# Patient Record
Sex: FEMALE
[De-identification: names, ages, dates, MRNs, and addresses within clinical notes are randomized per-mention and may not be internally consistent; named-entity substitution may affect disease eponyms.]

---

## 2020-11-16 ENCOUNTER — APPOINTMENT (OUTPATIENT)
Dept: VASCULAR SURGERY | Facility: CLINIC | Age: 80
End: 2020-11-16
Payer: MEDICARE

## 2020-11-16 VITALS — HEART RATE: 67 BPM | DIASTOLIC BLOOD PRESSURE: 61 MMHG | SYSTOLIC BLOOD PRESSURE: 120 MMHG

## 2020-11-16 VITALS — TEMPERATURE: 97.8 F

## 2020-11-16 DIAGNOSIS — I83.90 ASYMPTOMATIC VARICOSE VEINS OF UNSPECIFIED LOWER EXTREMITY: ICD-10-CM

## 2020-11-16 DIAGNOSIS — I25.2 OLD MYOCARDIAL INFARCTION: ICD-10-CM

## 2020-11-16 DIAGNOSIS — Z86.39 PERSONAL HISTORY OF OTHER ENDOCRINE, NUTRITIONAL AND METABOLIC DISEASE: ICD-10-CM

## 2020-11-16 DIAGNOSIS — Z86.73 PERSONAL HISTORY OF TRANSIENT ISCHEMIC ATTACK (TIA), AND CEREBRAL INFARCTION W/OUT RESIDUAL DEFICITS: ICD-10-CM

## 2020-11-16 DIAGNOSIS — I10 ESSENTIAL (PRIMARY) HYPERTENSION: ICD-10-CM

## 2020-11-16 DIAGNOSIS — Z87.448 PERSONAL HISTORY OF OTHER DISEASES OF URINARY SYSTEM: ICD-10-CM

## 2020-11-16 PROCEDURE — 93971 EXTREMITY STUDY: CPT

## 2020-11-16 PROCEDURE — 93880 EXTRACRANIAL BILAT STUDY: CPT

## 2020-11-16 PROCEDURE — 99024 POSTOP FOLLOW-UP VISIT: CPT

## 2020-11-16 RX ORDER — ALLOPURINOL 200 MG/1
TABLET ORAL
Refills: 0 | Status: ACTIVE | COMMUNITY

## 2020-11-16 RX ORDER — CARVEDILOL 3.12 MG/1
TABLET, FILM COATED ORAL
Refills: 0 | Status: ACTIVE | COMMUNITY

## 2020-11-16 RX ORDER — SITAGLIPTIN 100 MG/1
TABLET, FILM COATED ORAL
Refills: 0 | Status: ACTIVE | COMMUNITY

## 2020-11-16 RX ORDER — MEMANTINE HYDROCHLORIDE 10 MG/1
10 TABLET, FILM COATED ORAL
Refills: 0 | Status: ACTIVE | COMMUNITY

## 2020-11-16 RX ORDER — ATORVASTATIN CALCIUM 80 MG/1
TABLET, FILM COATED ORAL
Refills: 0 | Status: ACTIVE | COMMUNITY

## 2020-11-16 RX ORDER — LEVOTHYROXINE SODIUM 137 UG/1
TABLET ORAL
Refills: 0 | Status: ACTIVE | COMMUNITY

## 2020-11-16 RX ORDER — FENOFIBRATE 120 MG/1
TABLET ORAL
Refills: 0 | Status: ACTIVE | COMMUNITY

## 2020-11-16 RX ORDER — OMEPRAZOLE 20 MG/1
TABLET, DELAYED RELEASE ORAL
Refills: 0 | Status: ACTIVE | COMMUNITY

## 2020-11-16 RX ORDER — OXYBUTYNIN CHLORIDE 5 MG/1
5 TABLET ORAL
Refills: 0 | Status: ACTIVE | COMMUNITY

## 2020-11-16 RX ORDER — PIOGLITAZONE HYDROCHLORIDE 15 MG/1
15 TABLET ORAL
Refills: 0 | Status: ACTIVE | COMMUNITY

## 2020-11-16 RX ORDER — CLOPIDOGREL 75 MG/1
75 TABLET, FILM COATED ORAL
Refills: 0 | Status: ACTIVE | COMMUNITY

## 2020-11-16 RX ORDER — MONTELUKAST 10 MG/1
10 TABLET, FILM COATED ORAL
Refills: 0 | Status: ACTIVE | COMMUNITY

## 2020-11-16 RX ORDER — AMLODIPINE BESYLATE 5 MG/1
TABLET ORAL
Refills: 0 | Status: ACTIVE | COMMUNITY

## 2020-11-16 RX ORDER — FUROSEMIDE 20 MG/1
20 TABLET ORAL
Refills: 0 | Status: ACTIVE | COMMUNITY

## 2020-11-16 NOTE — HISTORY OF PRESENT ILLNESS
[FreeTextEntry1] : 79 yo female with a history of htn, dm, hypothyroidism, cad s/p mi, cva, ckd with worsening renal function presents for evaluation.  pt is right hand dominant without any history of ppm or surgery of left upper extremity.  \par pt reports history of cva went to [facility] in april and may reports left sided facial drop that had then resolved.

## 2020-11-16 NOTE — PHYSICAL EXAM
[2+] : left 2+ [No Rash or Lesion] : No rash or lesion [Alert] : alert [Calm] : calm [JVD] : no jugular venous distention  [Normal Breath Sounds] : Normal breath sounds [Normal Heart Sounds] : normal heart sounds [Ankle Swelling (On Exam)] : not present [Abdomen Masses] : No abdominal masses [Skin Ulcer] : no ulcer [de-identified] : appears well [de-identified] : no facial asymmetry

## 2020-11-16 NOTE — ASSESSMENT
[FreeTextEntry1] : 81 yo female with a history of htn, dm, hypothyroidism, cad s/p mi, cva, ckd with worsening renal function presents for evaluation\par \par vein mapping shows adequate left upper extremity cephalic vein at the wrist measuring 0.22-0.25 cm \par will plan for left upper extremity radial cephalic avf\par \par carotid duplex shows 16-49% stenosis b/l ica \par \par pt advised to start left upper extremity precautions no iv, no venipunctures and no bp cuff left upper extremity

## 2020-12-01 ENCOUNTER — OUTPATIENT (OUTPATIENT)
Dept: OUTPATIENT SERVICES | Facility: HOSPITAL | Age: 80
LOS: 1 days | End: 2020-12-01

## 2020-12-01 ENCOUNTER — APPOINTMENT (OUTPATIENT)
Dept: DISASTER EMERGENCY | Facility: CLINIC | Age: 80
End: 2020-12-01

## 2020-12-01 VITALS
HEIGHT: 61 IN | DIASTOLIC BLOOD PRESSURE: 64 MMHG | RESPIRATION RATE: 18 BRPM | SYSTOLIC BLOOD PRESSURE: 110 MMHG | WEIGHT: 169.09 LBS | OXYGEN SATURATION: 98 % | TEMPERATURE: 98 F | HEART RATE: 71 BPM

## 2020-12-01 DIAGNOSIS — N18.9 CHRONIC KIDNEY DISEASE, UNSPECIFIED: ICD-10-CM

## 2020-12-01 DIAGNOSIS — Z98.89 OTHER SPECIFIED POSTPROCEDURAL STATES: Chronic | ICD-10-CM

## 2020-12-01 DIAGNOSIS — M10.9 GOUT, UNSPECIFIED: ICD-10-CM

## 2020-12-01 DIAGNOSIS — Z98.51 TUBAL LIGATION STATUS: Chronic | ICD-10-CM

## 2020-12-01 DIAGNOSIS — E78.5 HYPERLIPIDEMIA, UNSPECIFIED: ICD-10-CM

## 2020-12-01 DIAGNOSIS — E03.9 HYPOTHYROIDISM, UNSPECIFIED: ICD-10-CM

## 2020-12-01 DIAGNOSIS — J30.2 OTHER SEASONAL ALLERGIC RHINITIS: ICD-10-CM

## 2020-12-01 DIAGNOSIS — I10 ESSENTIAL (PRIMARY) HYPERTENSION: ICD-10-CM

## 2020-12-01 DIAGNOSIS — Z01.818 ENCOUNTER FOR OTHER PREPROCEDURAL EXAMINATION: ICD-10-CM

## 2020-12-01 DIAGNOSIS — E11.9 TYPE 2 DIABETES MELLITUS WITHOUT COMPLICATIONS: ICD-10-CM

## 2020-12-01 RX ORDER — SODIUM CHLORIDE 9 MG/ML
3 INJECTION INTRAMUSCULAR; INTRAVENOUS; SUBCUTANEOUS EVERY 8 HOURS
Refills: 0 | Status: DISCONTINUED | OUTPATIENT
Start: 2020-12-03 | End: 2020-12-10

## 2020-12-01 NOTE — H&P PST ADULT - RS GEN PE MLT RESP DETAILS PC
airway patent/breath sounds equal/good air movement/no rhonchi/clear to auscultation bilaterally/respirations non-labored/no subcutaneous emphysema/no chest wall tenderness/normal/no wheezes/no intercostal retractions/no rales

## 2020-12-01 NOTE — H&P PST ADULT - HEART RATE (BEATS/MIN)
Problem: Communication  Goal: The ability to communicate needs accurately and effectively will improve    Intervention: Educate patient and significant other/support system about the plan of care, procedures, treatments, medications and allow for questions  Plan of care discussed with pt.      Problem: Venous Thromboembolism (VTW)/Deep Vein Thrombosis (DVT) Prevention:  Goal: Patient will participate in Venous Thrombosis (VTE)/Deep Vein Thrombosis (DVT)Prevention Measures    Intervention: Ensure patient wears graduated elastic stockings (KEITH hose) and/or SCDs, if ordered, when in bed or chair (Remove at least once per shift for skin check)  Pt wearing SCD's         71

## 2020-12-01 NOTE — H&P PST ADULT - GASTROINTESTINAL DETAILS
no masses palpable/bowel sounds normal/no bruit/nontender/no distention/normal/no rebound tenderness/soft

## 2020-12-01 NOTE — H&P PST ADULT - ACTIVITY
[facility] BE[facility]AVIOR[facility]  [facility]  [address]  [place] [address]      Juan Carlos Beckwith :1985 MRN:[id number]    2020 Time Session Began: 1100  Time Session Ended: 1200    Due to COVID-19 precautions, this visit was performed via live interactive two-way video with patient's verbal consent.   Clinician Location:Home.  Patient Location: Home.  Verified patient identity:  [x] Yes    Session Type:60 Minute Therapy (15376)    Others Present: no    Intervention: Behavioral, Cognitive, ACT, BSP, Interpersonal    Suicide/Homicide/Violence Ideation: No    If Yes, explain:     Current Outpatient Medications   Medication Sig   • cloNIDine (CATAPRES-TTS 2) 0.2 MG/24HR Place 1 patch onto the skin 1 day a week. Box includes patch and non-medicated adhesive cover. Apply adhesive cover if patch begins to lift.   • clonazePAM (KLONOPIN) 1 MG tablet Take 0.5-1 tablets by mouth 2 times daily as needed (severe anxiety, panic).   • zolpidem (AMBIEN) 5 MG tablet Take 1 tablet by mouth nightly as needed for Sleep.   • cloNIDine (CATAPRES) 0.1 MG tablet Take 2 tablets by mouth at bedtime AND 1 tablet daily. May also take 1 tablet 3 times daily as needed (anxiety, anger, PTSD).   • ibuprofen (MOTRIN) 600 MG tablet Take 600 mg by mouth every 6 hours.     No current facility-administered medications for this visit.        Change in Medication(s) Reported: No  If Yes, explain:     Patient/Family Education Provided: Yes  Patient/Family Displays Understanding: Yes    If No, explain:     Chief complaint in patient's own words: \"I am working on assertiveness and starting to live a meaningful life again\"    Progress Note containing chief complaint and symptoms/problems related to the complaint:    (Data/Action/Response/Plan)    D:   Client was agreeable to telehealth appointment.  Client reported some frustrations with starting new employment and the required online training that is not  being operated efficiently.  Client reported increased use acceptance and non judgemental list to help navigate this recent stressor.  Client reported intent to move in with his brother in the near term.     A: Writer provided support and empathy.  Explored client's emotional reactions and adjustment to starting new employment.  Discussed client's and of his prior employment and reviewed the process of starting a new job and terminating with that job on his mental health.  Discussed how his prior employment helped him build self trust in his ability to communicate with others and provided opportunities to increase use of acceptance.  Ended session answering client's question regarding setting goals.  Client wondered about the strategy of setting very high goals to help motivate action even if they are not achieved verses setting more moderate and realistic goals.  Encouraged client to set more realistic goals given client's past tendency for depressed moods.     Treatment plan- Address trauma with Adaptive disclosure, relaxation training, and mindfulness skills to practice diaphragmatic breathing, journal/write about previous losses/moral injuries, develop emotional tolerance (guilt, shame, fear, sadness), and increasing social activities.      Goal #3: Discharge Planning How will you know that you don't need to come to therapy anymore?    Slight improvement, Retain goal-Ability to socialize (go to sporting events) without reliance on alcohol or drugs to mitigate hypervigilance. Updated 6/3/20- improved ability to use less alcohol/drugs when socializing with trusted friends/family  Modify goal: Advancing in employment  Significant improvement: Improving ability to communicate assertively with others:Updated 6/3/20 improved when rehearsed ahead of time; still difficulty reacting in the moment  Improved and retain goal: Quitting smoking cigarettes:Updated 6/3/20 aware that smoking increases more with stress, decrease  in patch usage and cigarette intake  New goal: Reduced intensity of emotions based on emotional processing work: current level rating: work 6.5, home 5.5, family 6.        3/20/19 Previous Assessment Measures:   MARIIA-7 (Anxiety): 20  PHQ-9 (Depression): 18  PCL-5 (PTSD): 47  MIES (Moral Injury Event Scale): 38  Q-LES-Q-SF (Quality of Life Event and Satisfaction): 37%     7/30/19  PHQ score: 13  MARIIA score: 16  PCL-5 score: 41     2/26/20  PHQ score:  17  MARIIA score:  14  PCL-5 score: 37     Attending weekly: Mindfulness, emotional intelligence, anger management, communication, and  identity groups to help build education and develop skills for trauma processing.     R: The client was engaged throughout the session.  CT’s mood was calm, affect constricted. Ct stated understanding of material discussed.  Client stated intent to set more moderate goals given the discussed a period client was very reflective on his past occupational experience.  Client stated he may have to work a more late night schedule at his new employment but will contact his employment to see if they can modify his shift due to past sleep problems.    P:  Continue act and [place] focus to approved acceptance and process past losses and traumatic events that make it difficult for client to experience calmness, elvira, happiness.    Need for Community Resources Assessed: Yes    Resources Needed: Yes    If Yes, what resources:  support group    Diagnosis: PTSD (post-traumatic stress disorder)  (primary encounter diagnosis)    Treatment Plan: Unchanged, last updated 6/3/20.  Client unable to sign due to corona virus.  Client provided verbal agreement.  Client will sign when in person appointments resume.    Discharge Plan: N/A    Next Appointment: 1 week      Braulio David PSYD   tolerates stair climbing without any exertional symptoms ; uses walker or cane for ambulation due to poor balance

## 2020-12-01 NOTE — H&P PST ADULT - NEGATIVE ALLERGY TYPES
no reactions to insect bites/no reactions to food/no reactions to animals/no indoor environmental allergies/no reactions to medicines

## 2020-12-01 NOTE — H&P PST ADULT - NSICDXPASTMEDICALHX_GEN_ALL_CORE_FT
PAST MEDICAL HISTORY:  Chronic kidney disease, unspecified stage     Dyspepsia     Essential hypertension     Former smoker     Hypertension     Type 2 diabetes mellitus with stage 4 chronic kidney disease, unspecified long term insulin use status

## 2020-12-01 NOTE — H&P PST ADULT - HISTORY OF PRESENT ILLNESS
80 yr old  female with PMH of Diabetes, HTN, HLD, gout, CVA with right hemiplegia, seasonal allergies, Hypothyroidism, mild memory loss, GERD, stress incontinence presents with c/o decreased renal function due to CKD. Last EGFR 12, creatinine 3.34. Pt for left upper extremity radiocephalic  arteriovenous fistula creation on 12/03/2020.

## 2020-12-01 NOTE — H&P PST ADULT - NSICDXFAMILYHX_GEN_ALL_CORE_FT
FAMILY HISTORY:  Family history of heart attack, sister  Family history of heart disease, sister

## 2020-12-01 NOTE — H&P PST ADULT - NS PRO REFERRAL CMGT
Receiving homecare prior to admission/[facility] [phone] Home attendant services from [facility] ( 4 hours a day on weekdays and 5 hours on weekends) [phone]/Receiving homecare prior to admission

## 2020-12-01 NOTE — H&P PST ADULT - ASSESSMENT
80 yr old  female with PMH of Diabetes, HTN, HLD, gout, CVA with right hemiplegia, seasonal allergies, Hypothyroidism, mild memory loss, GERD, stress incontinence presents with chronic kidney disease. Last EGFR 12, creatinine 3.34. Pt is scheduled for left upper extremity radiocephalic  arteriovenous fistula creation on 12/03/2020.

## 2020-12-01 NOTE — H&P PST ADULT - DOES PATIENT HAVE ADVANCE DIRECTIVE
Pt's daughter Pamela Bishop will make decisions in case of emergency/No No/Pt's daughter Pamela Bishop [phone] will make decisions in case of emergency

## 2020-12-01 NOTE — H&P PST ADULT - NSICDXPROBLEM_GEN_ALL_CORE_FT
PROBLEM DIAGNOSES  Problem: Seasonal allergies  Assessment and Plan: Instructed to continue medication as needed and to follow-up with PCP for allergy management.     Problem: Hypothyroidism  Assessment and Plan: Instructed to continue Levothyroxine and take with sips of water on day of surgery. Follow-up with provider for management.       Problem: DM (diabetes mellitus)  Assessment and Plan: Perioperative glucose monitoring and coverage as needed. Follow-up with PCP for diabetic management     Problem: Gout  Assessment and Plan: Instructed to continue Allopurinol and to follow-up with provider for management.     Problem: HLD (hyperlipidemia)  Assessment and Plan: Instructed pt to continue Atorvastatin and to follow-up with PCP for lipid management     Problem: HTN (hypertension)  Assessment and Plan: Instructed to continue carvedilol and take with sips of water on day of surgery.  Cleared by PCP. Follow-up with PCP for management.     Problem: Chronic kidney disease (CKD)  Assessment and Plan: Left upper extremity radiocephalic arteriovenous fistula creationon 12/03/2020. Preoperative instructions discussed with pt via Hong Konger speaking daughter and given to pt. Instructed pt and pt's daughter that she will need someone to escort her home after surgery, that no one will be allowed to come to hospital with her, to notify security when she arrives in the arrives in the lobby of the hospital that she is here for surgery, not to eat or drink anything after midnight the night before the surgery, to avoid NSAIDS such as Ibuprofen, motrin, aleve, advil, naproxen before surgery, to take Tylenol if needed for pain, to report if she has been exposed to any one with any contagious diseases including Covid-19 or if she is exhibiting any symptoms of COVID-19. Instructed about use of Chlorhexidine 4% soap before surgery. Verbalized understanding of instructions given.        PROBLEM DIAGNOSES  Problem: At risk for venous thromboembolism (VTE)  Assessment and Plan: VTE score risk 5. Perioperative VTE prophylaxis     Problem: CVA (cerebrovascular accident)  Assessment and Plan: with right hemiplegia. Safety measures perioperatively     Problem: Seasonal allergies  Assessment and Plan: Instructed to continue medication as needed and to follow-up with PCP for allergy management.     Problem: Hypothyroidism  Assessment and Plan: Instructed to continue Levothyroxine and take with sips of water on day of surgery. Follow-up with provider for management.       Problem: DM (diabetes mellitus)  Assessment and Plan: Perioperative glucose monitoring and coverage as needed. Follow-up with PCP for diabetic management     Problem: Gout  Assessment and Plan: Instructed to continue Allopurinol and to follow-up with provider for management.     Problem: HLD (hyperlipidemia)  Assessment and Plan: Instructed pt to continue Atorvastatin and to follow-up with PCP for lipid management     Problem: HTN (hypertension)  Assessment and Plan: Instructed to continue carvedilol and take with sips of water on day of surgery.  Cleared by PCP. Follow-up with PCP for management.     Problem: Chronic kidney disease (CKD)  Assessment and Plan: Left upper extremity radiocephalic arteriovenous fistula creationon 12/03/2020. Preoperative instructions discussed with pt via Angolan speaking daughter and given to pt. Instructed pt and pt's daughter that she will need someone to escort her home after surgery, that no one will be allowed to come to hospital with her, to notify security when she arrives in the arrives in the lobby of the hospital that she is here for surgery, not to eat or drink anything after midnight the night before the surgery, to avoid NSAIDS such as Ibuprofen, motrin, aleve, advil, naproxen before surgery, to take Tylenol if needed for pain, to report if she has been exposed to any one with any contagious diseases including Covid-19 or if she is exhibiting any symptoms of COVID-19. Instructed about use of Chlorhexidine 4% soap before surgery. Verbalized understanding of instructions given.

## 2020-12-01 NOTE — H&P PST ADULT - NEGATIVE GASTROINTESTINAL SYMPTOMS
no change in bowel habits/no flatulence/no abdominal pain/no vomiting/no nausea/no diarrhea/no melena/no constipation

## 2020-12-02 ENCOUNTER — TRANSCRIPTION ENCOUNTER (OUTPATIENT)
Age: 80
End: 2020-12-02

## 2020-12-02 DIAGNOSIS — I63.9 CEREBRAL INFARCTION, UNSPECIFIED: ICD-10-CM

## 2020-12-02 DIAGNOSIS — Z91.89 OTHER SPECIFIED PERSONAL RISK FACTORS, NOT ELSEWHERE CLASSIFIED: ICD-10-CM

## 2020-12-02 LAB — SARS-COV-2 N GENE NPH QL NAA+PROBE: NOT DETECTED

## 2020-12-03 ENCOUNTER — APPOINTMENT (OUTPATIENT)
Dept: VASCULAR SURGERY | Facility: HOSPITAL | Age: 80
End: 2020-12-03
Payer: MEDICARE

## 2020-12-03 ENCOUNTER — OUTPATIENT (OUTPATIENT)
Dept: OUTPATIENT SERVICES | Facility: HOSPITAL | Age: 80
LOS: 1 days | End: 2020-12-03
Payer: COMMERCIAL

## 2020-12-03 VITALS
DIASTOLIC BLOOD PRESSURE: 54 MMHG | OXYGEN SATURATION: 97 % | SYSTOLIC BLOOD PRESSURE: 135 MMHG | HEART RATE: 72 BPM | RESPIRATION RATE: 16 BRPM | WEIGHT: 169.09 LBS | HEIGHT: 61 IN | TEMPERATURE: 98 F

## 2020-12-03 VITALS
RESPIRATION RATE: 16 BRPM | DIASTOLIC BLOOD PRESSURE: 55 MMHG | OXYGEN SATURATION: 96 % | SYSTOLIC BLOOD PRESSURE: 143 MMHG | TEMPERATURE: 97 F | HEART RATE: 80 BPM

## 2020-12-03 DIAGNOSIS — Z98.89 OTHER SPECIFIED POSTPROCEDURAL STATES: Chronic | ICD-10-CM

## 2020-12-03 DIAGNOSIS — Z98.51 TUBAL LIGATION STATUS: Chronic | ICD-10-CM

## 2020-12-03 DIAGNOSIS — N18.9 CHRONIC KIDNEY DISEASE, UNSPECIFIED: ICD-10-CM

## 2020-12-03 LAB
ANION GAP SERPL CALC-SCNC: 8 MMOL/L — SIGNIFICANT CHANGE UP (ref 5–17)
BUN SERPL-MCNC: 54 MG/DL — HIGH (ref 7–18)
CALCIUM SERPL-MCNC: 9.8 MG/DL — SIGNIFICANT CHANGE UP (ref 8.4–10.5)
CHLORIDE SERPL-SCNC: 102 MMOL/L — SIGNIFICANT CHANGE UP (ref 96–108)
CO2 SERPL-SCNC: 31 MMOL/L — SIGNIFICANT CHANGE UP (ref 22–31)
CREAT SERPL-MCNC: 2.86 MG/DL — HIGH (ref 0.5–1.3)
GLUCOSE SERPL-MCNC: 143 MG/DL — HIGH (ref 70–99)
POTASSIUM SERPL-MCNC: 3.5 MMOL/L — SIGNIFICANT CHANGE UP (ref 3.5–5.3)
POTASSIUM SERPL-SCNC: 3.5 MMOL/L — SIGNIFICANT CHANGE UP (ref 3.5–5.3)
SODIUM SERPL-SCNC: 141 MMOL/L — SIGNIFICANT CHANGE UP (ref 135–145)

## 2020-12-03 PROCEDURE — 36820 AV FUSION/FOREARM VEIN: CPT

## 2020-12-03 PROCEDURE — 93005 ELECTROCARDIOGRAM TRACING: CPT

## 2020-12-03 PROCEDURE — 93010 ELECTROCARDIOGRAM REPORT: CPT

## 2020-12-03 PROCEDURE — 36415 COLL VENOUS BLD VENIPUNCTURE: CPT

## 2020-12-03 PROCEDURE — 36821 AV FUSION DIRECT ANY SITE: CPT

## 2020-12-03 PROCEDURE — 80048 BASIC METABOLIC PNL TOTAL CA: CPT

## 2020-12-03 PROCEDURE — 35321 RECHANNELING OF ARTERY: CPT

## 2020-12-03 PROCEDURE — C1889: CPT

## 2020-12-03 RX ORDER — CLOPIDOGREL BISULFATE 75 MG/1
1 TABLET, FILM COATED ORAL
Qty: 0 | Refills: 0 | DISCHARGE

## 2020-12-03 RX ORDER — MEMANTINE HYDROCHLORIDE 10 MG/1
1 TABLET ORAL
Qty: 0 | Refills: 0 | DISCHARGE

## 2020-12-03 RX ORDER — OMEPRAZOLE 10 MG/1
1 CAPSULE, DELAYED RELEASE ORAL
Qty: 0 | Refills: 0 | DISCHARGE

## 2020-12-03 RX ORDER — OXYBUTYNIN CHLORIDE 5 MG
1 TABLET ORAL
Qty: 0 | Refills: 0 | DISCHARGE

## 2020-12-03 RX ORDER — FUROSEMIDE 40 MG
1.5 TABLET ORAL
Qty: 0 | Refills: 0 | DISCHARGE

## 2020-12-03 RX ORDER — OXYCODONE AND ACETAMINOPHEN 5; 325 MG/1; MG/1
1 TABLET ORAL ONCE
Refills: 0 | Status: DISCONTINUED | OUTPATIENT
Start: 2020-12-03 | End: 2020-12-03

## 2020-12-03 RX ORDER — FENOFIBRATE,MICRONIZED 130 MG
1 CAPSULE ORAL
Qty: 0 | Refills: 0 | DISCHARGE

## 2020-12-03 NOTE — ASU DISCHARGE PLAN (ADULT/PEDIATRIC) - CALL YOUR DOCTOR IF YOU HAVE ANY OF THE FOLLOWING:
Bleeding that does not stop/Numbness, tingling, color or temperature change to extremity/Swelling that gets worse/Pain not relieved by Medications/Fever greater than (need to indicate Fahrenheit or Celsius)

## 2020-12-03 NOTE — ASU DISCHARGE PLAN (ADULT/PEDIATRIC) - CARE PROVIDER_API CALL
Keven Mack  VASCULAR SURGERY  [address] [facility] [address], [address]  [place] [address]  Phone: [phone]  Fax: [phone]  Follow Up Time:

## 2020-12-03 NOTE — BRIEF OPERATIVE NOTE - OPERATION/FINDINGS
creation of left upper extremity radiocephalic fistula. Palpable thrill present at the end of the case

## 2020-12-21 ENCOUNTER — APPOINTMENT (OUTPATIENT)
Dept: VASCULAR SURGERY | Facility: CLINIC | Age: 80
End: 2020-12-21
Payer: MEDICARE

## 2020-12-21 VITALS — TEMPERATURE: 97.1 F

## 2020-12-21 VITALS — HEART RATE: 105 BPM | SYSTOLIC BLOOD PRESSURE: 127 MMHG | DIASTOLIC BLOOD PRESSURE: 77 MMHG

## 2020-12-21 PROCEDURE — 93990 DOPPLER FLOW TESTING: CPT

## 2020-12-21 PROCEDURE — 99024 POSTOP FOLLOW-UP VISIT: CPT

## 2020-12-21 PROCEDURE — 99072 ADDL SUPL MATRL&STAF TM PHE: CPT

## 2020-12-21 NOTE — DISCUSSION/SUMMARY
[FreeTextEntry1] : 81 yo female with history of ckd not on hd at this time presents for follow up status post left upper extremity avf \par \par duplex shows patent avf with flow rate of 468 measuring 0.4-0.5 cm \par \par at this time will continue to monitor and pt to follow up in 1 month with repeat duplex

## 2020-12-21 NOTE — PHYSICAL EXAM
[Alert] : alert [Calm] : calm [JVD] : no jugular venous distention  [de-identified] : appears well  [de-identified] : incision is clean and dry with small eschar over the distal aspect and mild edema

## 2020-12-21 NOTE — REASON FOR VISIT
[de-identified] : left upper extremity radial cephalic avf  [de-identified] : 12/3/20 [de-identified] : pt without any complaints at this time\par pt reports improved gfr not currently on hd at this time

## 2021-02-08 ENCOUNTER — APPOINTMENT (OUTPATIENT)
Dept: VASCULAR SURGERY | Facility: CLINIC | Age: 81
End: 2021-02-08
Payer: MEDICARE

## 2021-02-08 DIAGNOSIS — I77.0 ARTERIOVENOUS FISTULA, ACQUIRED: ICD-10-CM

## 2021-02-08 PROCEDURE — 99024 POSTOP FOLLOW-UP VISIT: CPT

## 2021-02-08 PROCEDURE — 93990 DOPPLER FLOW TESTING: CPT

## 2021-02-08 NOTE — ASSESSMENT
[FreeTextEntry1] : Patient with renal failure.  Patient is not on dialysis.  Patient reports improvement of kidney function.  Left radiocephalic fistula with inadequate flow, but at this point since the patient's kidney function is improving, I would like to hold off any further intervention to avoid giving patient contrast.  Close follow-up.  See her in 3 months.

## 2021-02-08 NOTE — PHYSICAL EXAM
[Thrill] : thrill [Aneurysm] : no aneurysm [Normal] : coordination grossly intact, no focal deficits

## 2021-02-08 NOTE — REASON FOR VISIT
[Follow-Up Visit] : a follow-up visit for [Inadequate Flow within AVF] : inadequate flow within AVF [Non-Maturing Fistula] : non-maturing fistula

## 2021-03-22 ENCOUNTER — APPOINTMENT (OUTPATIENT)
Dept: VASCULAR SURGERY | Facility: CLINIC | Age: 81
End: 2021-03-22
Payer: MEDICARE

## 2021-03-22 ENCOUNTER — NON-APPOINTMENT (OUTPATIENT)
Age: 81
End: 2021-03-22

## 2021-03-22 VITALS — TEMPERATURE: 96.8 F

## 2021-03-22 VITALS — HEART RATE: 75 BPM | HEIGHT: 61 IN | WEIGHT: 164 LBS | BODY MASS INDEX: 30.96 KG/M2

## 2021-03-22 PROCEDURE — 99213 OFFICE O/P EST LOW 20 MIN: CPT

## 2021-03-22 PROCEDURE — 99072 ADDL SUPL MATRL&STAF TM PHE: CPT

## 2021-03-22 PROCEDURE — 93990 DOPPLER FLOW TESTING: CPT

## 2021-03-22 NOTE — PHYSICAL EXAM
[Thrill] : thrill [Normal] : normoactive bowel sounds, soft and nontender, no hepatosplenomegaly or masses appreciated

## 2021-03-22 NOTE — ASSESSMENT
[FreeTextEntry1] : Patient with renal failure.  Patient not on hemodialysis yet.  Left arm AV fistula with inadequate flow.  Plan for fistulogram and maturation.

## 2021-04-04 ENCOUNTER — TRANSCRIPTION ENCOUNTER (OUTPATIENT)
Age: 81
End: 2021-04-04

## 2021-04-09 ENCOUNTER — APPOINTMENT (OUTPATIENT)
Dept: DISASTER EMERGENCY | Facility: CLINIC | Age: 81
End: 2021-04-09

## 2021-04-16 DIAGNOSIS — Z01.818 ENCOUNTER FOR OTHER PREPROCEDURAL EXAMINATION: ICD-10-CM

## 2021-04-23 ENCOUNTER — APPOINTMENT (OUTPATIENT)
Dept: DISASTER EMERGENCY | Facility: CLINIC | Age: 81
End: 2021-04-23

## 2021-04-26 PROBLEM — N18.9 CHRONIC KIDNEY DISEASE, UNSPECIFIED CKD STAGE: Status: ACTIVE | Noted: 2020-11-16

## 2021-04-26 PROBLEM — T82.898A INADEQUATE FLOW OF DIALYSIS ARTERIOVENOUS FISTULA: Status: ACTIVE | Noted: 2021-04-26

## 2021-04-27 ENCOUNTER — RESULT REVIEW (OUTPATIENT)
Age: 81
End: 2021-04-27

## 2021-04-27 ENCOUNTER — APPOINTMENT (OUTPATIENT)
Dept: ENDOVASCULAR SURGERY | Facility: CLINIC | Age: 81
End: 2021-04-27
Payer: MEDICARE

## 2021-04-27 VITALS
OXYGEN SATURATION: 94 % | RESPIRATION RATE: 15 BRPM | DIASTOLIC BLOOD PRESSURE: 79 MMHG | HEART RATE: 81 BPM | HEIGHT: 61 IN | SYSTOLIC BLOOD PRESSURE: 152 MMHG | TEMPERATURE: 97.6 F | BODY MASS INDEX: 30.96 KG/M2 | WEIGHT: 164 LBS

## 2021-04-27 DIAGNOSIS — T82.898A OTHER SPECIFIED COMPLICATION OF VASCULAR PROSTHETIC DEVICES, IMPLANTS AND GRAFTS, INITIAL ENCOUNTER: ICD-10-CM

## 2021-04-27 DIAGNOSIS — N18.9 CHRONIC KIDNEY DISEASE, UNSPECIFIED: ICD-10-CM

## 2021-04-27 PROCEDURE — 99072 ADDL SUPL MATRL&STAF TM PHE: CPT

## 2021-04-27 PROCEDURE — 36215Z: CUSTOM | Mod: 59

## 2021-04-27 PROCEDURE — 36902Z: CUSTOM

## 2021-04-27 RX ORDER — LOSARTAN POTASSIUM 100 MG/1
TABLET, FILM COATED ORAL
Refills: 0 | Status: DISCONTINUED | COMMUNITY
End: 2021-04-27

## 2021-04-30 NOTE — HISTORY OF PRESENT ILLNESS
[] : left radiocephalic fistula [FreeTextEntry1] : 12/3/2020 Dr. Mack [FreeTextEntry5] : yesterday at 8 pm [FreeTextEntry6] : Dr. Flynn

## 2021-04-30 NOTE — PAST MEDICAL HISTORY
[Increasing age ( >40 years old)] : Increasing age ( >40 years old) [No therapy indicated for cases scheduled for less than one hour] : No therapy indicated for cases scheduled for less than one hour. [FreeTextEntry1] : Malignant Hyperthermia Screening Tool and Risk of Bleeding Assessment\par \par Ms. J LUIS MCGUIRE denies family history of unexpected death following Anesthesia or Exercise.\par Denies Family history of Malignant Hyperthermia, Muscle or Neuromuscular disorder and High Temperature following exercise.\par \par Ms. J LUIS MCGUIRE denies history of Muscle Spasm, Dark or Chocolate - Colored urine and Unanticipated fever immediately following anesthesia or serious exercise. \par Ms. MCGUIRE also denies bleeding tendencies/ Risks of Bleeding.\par

## 2021-05-10 ENCOUNTER — APPOINTMENT (OUTPATIENT)
Dept: VASCULAR SURGERY | Facility: CLINIC | Age: 81
End: 2021-05-10
Payer: MEDICARE

## 2021-05-10 VITALS
SYSTOLIC BLOOD PRESSURE: 154 MMHG | HEIGHT: 61 IN | DIASTOLIC BLOOD PRESSURE: 73 MMHG | WEIGHT: 164 LBS | BODY MASS INDEX: 30.96 KG/M2 | HEART RATE: 71 BPM

## 2021-05-10 PROCEDURE — 99213 OFFICE O/P EST LOW 20 MIN: CPT

## 2021-05-10 PROCEDURE — 93990 DOPPLER FLOW TESTING: CPT

## 2021-05-10 PROCEDURE — 99072 ADDL SUPL MATRL&STAF TM PHE: CPT

## 2021-05-10 RX ORDER — CLOBETASOL PROPIONATE 0.5 MG/G
0.05 CREAM TOPICAL
Qty: 60 | Refills: 0 | Status: ACTIVE | COMMUNITY
Start: 2021-05-03

## 2021-05-10 RX ORDER — CLOTRIMAZOLE 10 MG/G
1 CREAM TOPICAL
Qty: 45 | Refills: 0 | Status: ACTIVE | COMMUNITY
Start: 2021-05-03

## 2021-05-10 RX ORDER — FUROSEMIDE 40 MG/1
40 TABLET ORAL
Qty: 60 | Refills: 0 | Status: ACTIVE | COMMUNITY
Start: 2020-12-03

## 2021-05-10 RX ORDER — PIOGLITAZONE HYDROCHLORIDE 45 MG/1
45 TABLET ORAL
Qty: 30 | Refills: 0 | Status: ACTIVE | COMMUNITY
Start: 2021-05-03

## 2021-05-10 RX ORDER — FENOFIBRATE 134 MG/1
134 CAPSULE ORAL
Qty: 90 | Refills: 0 | Status: ACTIVE | COMMUNITY
Start: 2021-04-27

## 2021-05-10 RX ORDER — ATORVASTATIN CALCIUM 40 MG/1
40 TABLET, FILM COATED ORAL
Qty: 90 | Refills: 0 | Status: ACTIVE | COMMUNITY
Start: 2021-04-27

## 2021-05-10 RX ORDER — OXYCODONE AND ACETAMINOPHEN 5; 325 MG/1; MG/1
5-325 TABLET ORAL
Qty: 8 | Refills: 0 | Status: ACTIVE | COMMUNITY
Start: 2020-12-03

## 2021-05-10 RX ORDER — POTASSIUM CHLORIDE 1500 MG/1
20 TABLET, EXTENDED RELEASE ORAL
Qty: 30 | Refills: 0 | Status: ACTIVE | COMMUNITY
Start: 2021-05-08

## 2021-05-10 RX ORDER — LEVOTHYROXINE SODIUM 0.03 MG/1
25 TABLET ORAL
Qty: 30 | Refills: 0 | Status: ACTIVE | COMMUNITY
Start: 2021-05-03

## 2021-05-10 RX ORDER — POTASSIUM CHLORIDE 750 MG/1
10 TABLET, FILM COATED, EXTENDED RELEASE ORAL
Qty: 30 | Refills: 0 | Status: ACTIVE | COMMUNITY
Start: 2021-01-13

## 2021-05-10 NOTE — HISTORY OF PRESENT ILLNESS
[FreeTextEntry1] : Patient with renal failure.  Patient not on hemodialysis.  Left arm AV fistula status post maturation.  Patient and the daughter report improvement of renal function. [] : left radiocephalic fistula

## 2021-05-10 NOTE — PHYSICAL EXAM
[Thrill] : thrill [Pulsatile Thrill] : no pulsatile thrill [Aneurysm] : no aneurysm [Bleeding] : no bleeding [Normal] : normoactive bowel sounds, soft and nontender, no hepatosplenomegaly or masses appreciated

## 2021-05-10 NOTE — ASSESSMENT
[FreeTextEntry1] : Patient with renal failure, not on hemodialysis.  Left radiocephalic fistula may need further maturation.  But at this point since the patient is not on hemodialysis and probably will not be over the next month.  Would like to continue conservative management with follow-up in 4-6 weeks.

## 2021-06-28 ENCOUNTER — APPOINTMENT (OUTPATIENT)
Dept: VASCULAR SURGERY | Facility: CLINIC | Age: 81
End: 2021-06-28
Payer: MEDICARE

## 2021-06-28 VITALS
DIASTOLIC BLOOD PRESSURE: 71 MMHG | WEIGHT: 164 LBS | HEART RATE: 79 BPM | HEIGHT: 61 IN | SYSTOLIC BLOOD PRESSURE: 122 MMHG | BODY MASS INDEX: 30.96 KG/M2

## 2021-06-28 PROCEDURE — 99212 OFFICE O/P EST SF 10 MIN: CPT

## 2021-06-28 PROCEDURE — 93990 DOPPLER FLOW TESTING: CPT

## 2021-06-28 NOTE — HISTORY OF PRESENT ILLNESS
[FreeTextEntry1] : 82 yo female with a history of htn, dm, hypothyroidism, cad s/p mi, cva, ckd no currently on hd.  pt denies any history of left upper extremity pain \par pt states that the renal function has gotten better so hd has been on hold [] : left radiocephalic fistula

## 2021-06-28 NOTE — PHYSICAL EXAM
[Thrill] : thrill [Pulsatile Thrill] : no pulsatile thrill [Aneurysm] : no aneurysm [Hand well perfused] : hand well perfused [Ulcer] : no ulcer [2+] : left 2+ [Normal] : coordination grossly intact, no focal deficits [de-identified] : intact

## 2021-06-28 NOTE — DISCUSSION/SUMMARY
[FreeTextEntry1] : 80 yo female with a history of htn, dm, hypothyroidism, cad s/p mi, cva, ckd no currently on hd\par \par duplex shows patent avf with flow rate of 1059 with a 50-75% stenosis at the juxta anastomosis, vein measures 5-7 mm \par \par will continue to monitor\par pt to follow up in 3-4 months with repeat duplex\par

## 2021-09-03 NOTE — ASU PATIENT PROFILE, ADULT - PMH
Chronic kidney disease, unspecified stage    Dyspepsia    Essential hypertension    Former smoker    Hypertension    Type 2 diabetes mellitus with stage 4 chronic kidney disease, unspecified long term insulin use status     negative

## 2021-10-04 ENCOUNTER — APPOINTMENT (OUTPATIENT)
Dept: VASCULAR SURGERY | Facility: CLINIC | Age: 81
End: 2021-10-04
Payer: MEDICARE

## 2021-10-04 VITALS
WEIGHT: 163 LBS | HEART RATE: 77 BPM | SYSTOLIC BLOOD PRESSURE: 134 MMHG | DIASTOLIC BLOOD PRESSURE: 86 MMHG | BODY MASS INDEX: 30.78 KG/M2 | HEIGHT: 61 IN

## 2021-10-04 PROCEDURE — 93990 DOPPLER FLOW TESTING: CPT

## 2021-10-04 PROCEDURE — 99213 OFFICE O/P EST LOW 20 MIN: CPT

## 2021-10-04 RX ORDER — FLUTICASONE PROPIONATE 50 UG/1
50 SPRAY, METERED NASAL
Qty: 48 | Refills: 0 | Status: ACTIVE | COMMUNITY
Start: 2021-08-24

## 2021-10-04 RX ORDER — ICOSAPENT ETHYL 1000 MG/1
1 CAPSULE ORAL
Qty: 180 | Refills: 0 | Status: ACTIVE | COMMUNITY
Start: 2021-08-24

## 2021-10-04 RX ORDER — ALBUTEROL SULFATE 90 UG/1
108 (90 BASE) INHALANT RESPIRATORY (INHALATION)
Qty: 54 | Refills: 0 | Status: ACTIVE | COMMUNITY
Start: 2021-08-24

## 2021-10-04 RX ORDER — DICYCLOMINE HYDROCHLORIDE 10 MG/1
10 CAPSULE ORAL
Qty: 180 | Refills: 0 | Status: ACTIVE | COMMUNITY
Start: 2021-08-24

## 2021-10-04 RX ORDER — OLOPATADINE HYDROCHLORIDE 2 MG/ML
0.2 SOLUTION OPHTHALMIC
Qty: 8 | Refills: 0 | Status: ACTIVE | COMMUNITY
Start: 2021-08-24

## 2021-10-04 RX ORDER — HYDROCORTISONE 1 %
12 CREAM (GRAM) TOPICAL
Qty: 675 | Refills: 0 | Status: ACTIVE | COMMUNITY
Start: 2021-08-24

## 2021-10-04 NOTE — HISTORY OF PRESENT ILLNESS
[FreeTextEntry1] : 80 yo female with a history of htn, dm, hypothyroidism, cad s/p mi, cva, ckd no currently on hd.  pt denies any history of left upper extremity pain \par pt states that the renal function has gotten better so hd has been on hold [] : left radiocephalic fistula

## 2021-10-04 NOTE — DISCUSSION/SUMMARY
[FreeTextEntry1] : 80 yo female with a history of htn, dm, hypothyroidism, cad s/p mi, cva, ckd no currently on hd\par \par \par will continue to monitor\par pt to follow up in 3-4 months with repeat duplex\par

## 2021-10-04 NOTE — PHYSICAL EXAM
[Thrill] : thrill [Pulsatile Thrill] : no pulsatile thrill [Aneurysm] : no aneurysm [Hand well perfused] : hand well perfused [Ulcer] : no ulcer [2+] : left 2+ [Normal] : coordination grossly intact, no focal deficits [de-identified] : intact

## 2022-01-03 ENCOUNTER — APPOINTMENT (OUTPATIENT)
Dept: VASCULAR SURGERY | Facility: CLINIC | Age: 82
End: 2022-01-03
Payer: MEDICARE

## 2022-01-03 VITALS
HEART RATE: 73 BPM | BODY MASS INDEX: 30.78 KG/M2 | WEIGHT: 163 LBS | SYSTOLIC BLOOD PRESSURE: 111 MMHG | DIASTOLIC BLOOD PRESSURE: 60 MMHG | HEIGHT: 61 IN

## 2022-01-03 PROCEDURE — 93990 DOPPLER FLOW TESTING: CPT

## 2022-01-03 PROCEDURE — ZZZZZ: CPT

## 2022-01-03 PROCEDURE — 93970 EXTREMITY STUDY: CPT | Mod: 59

## 2022-01-03 NOTE — DISCUSSION/SUMMARY
[FreeTextEntry1] : 80 yo female with a history of htn, dm, hypothyroidism, cad s/p mi, cva, ckd no currently on hd\par \par \par will continue to monitor\par pt to follow up in 6 to 8 weeks with repeat duplex\par

## 2022-01-03 NOTE — PHYSICAL EXAM
[Thrill] : thrill [Pulsatile Thrill] : no pulsatile thrill [Aneurysm] : no aneurysm [Hand well perfused] : hand well perfused [Ulcer] : no ulcer [2+] : left 2+ [Normal] : coordination grossly intact, no focal deficits [de-identified] : intact

## 2022-02-16 ENCOUNTER — TRANSCRIPTION ENCOUNTER (OUTPATIENT)
Age: 82
End: 2022-02-16

## 2022-03-14 ENCOUNTER — APPOINTMENT (OUTPATIENT)
Dept: VASCULAR SURGERY | Facility: CLINIC | Age: 82
End: 2022-03-14
Payer: MEDICARE

## 2022-03-14 DIAGNOSIS — T82.858A STENOSIS OF OTHER VASCULAR PROSTHETIC, INITIAL ENCOUNTER: ICD-10-CM

## 2022-03-14 PROCEDURE — 93990 DOPPLER FLOW TESTING: CPT

## 2022-03-14 PROCEDURE — 99213 OFFICE O/P EST LOW 20 MIN: CPT

## 2022-03-14 NOTE — HISTORY OF PRESENT ILLNESS
[FreeTextEntry1] : 81 yo female with a history of htn, dm, hypothyroidism, cad s/p mi, cva, ckd not currently on hd.  pt denies any history of left upper extremity pain \par pt states that the renal function has gotten better so hd has been on hold [] : left radiocephalic fistula

## 2022-03-14 NOTE — REASON FOR VISIT
Coffee ground emesis Coffee ground emesis Coffee ground emesis Coffee ground emesis [Follow-Up Visit] : a follow-up visit for Coffee ground emesis

## 2022-03-14 NOTE — PHYSICAL EXAM
[Thrill] : thrill [Pulsatile Thrill] : no pulsatile thrill [Aneurysm] : no aneurysm [Bleeding] : no bleeding [Hand well perfused] : hand well perfused [Ulcer] : no ulcer [2+] : left 2+ [Normal] : coordination grossly intact, no focal deficits [de-identified] :  strength 5/5 b/l upper extremities [de-identified] : intact

## 2022-03-14 NOTE — DISCUSSION/SUMMARY
[FreeTextEntry1] : 81 yo female with a history of htn, dm, hypothyroidism, cad s/p mi, cva, ckd not currently on hd presents for follow up of left upper extremity avf \par \par duplex shows >75% stenosis of the juxta anastomosis with flow rate of 603\par \par given severe stenosis with faint thrill over the proximal forearm will arrange for fistulagram

## 2022-06-20 ENCOUNTER — APPOINTMENT (OUTPATIENT)
Dept: VASCULAR SURGERY | Facility: CLINIC | Age: 82
End: 2022-06-20

## 2022-09-10 ENCOUNTER — NON-APPOINTMENT (OUTPATIENT)
Age: 82
End: 2022-09-10

## 2024-10-20 ENCOUNTER — INPATIENT (INPATIENT)
Facility: HOSPITAL | Age: 84
LOS: 3 days | Discharge: HOME | End: 2024-10-24
Attending: INTERNAL MEDICINE | Admitting: INTERNAL MEDICINE
Payer: MEDICARE

## 2024-10-20 VITALS
HEIGHT: 55 IN | DIASTOLIC BLOOD PRESSURE: 84 MMHG | HEART RATE: 126 BPM | OXYGEN SATURATION: 98 % | SYSTOLIC BLOOD PRESSURE: 168 MMHG | WEIGHT: 141.98 LBS | TEMPERATURE: 101 F | RESPIRATION RATE: 23 BRPM

## 2024-10-20 DIAGNOSIS — Z98.89 OTHER SPECIFIED POSTPROCEDURAL STATES: Chronic | ICD-10-CM

## 2024-10-20 DIAGNOSIS — Z98.51 TUBAL LIGATION STATUS: Chronic | ICD-10-CM

## 2024-10-20 LAB
ALBUMIN SERPL ELPH-MCNC: 3.1 G/DL — LOW (ref 3.3–5)
ALP SERPL-CCNC: 80 U/L — SIGNIFICANT CHANGE UP (ref 40–120)
ALT FLD-CCNC: 20 U/L — SIGNIFICANT CHANGE UP (ref 12–78)
ANION GAP SERPL CALC-SCNC: 8 MMOL/L — SIGNIFICANT CHANGE UP (ref 5–17)
APTT BLD: 36.8 SEC — HIGH (ref 24.5–35.6)
AST SERPL-CCNC: 41 U/L — HIGH (ref 15–37)
BASOPHILS # BLD AUTO: 0.03 K/UL — SIGNIFICANT CHANGE UP (ref 0–0.2)
BASOPHILS NFR BLD AUTO: 0.4 % — SIGNIFICANT CHANGE UP (ref 0–2)
BILIRUB SERPL-MCNC: 0.7 MG/DL — SIGNIFICANT CHANGE UP (ref 0.2–1.2)
BUN SERPL-MCNC: 36 MG/DL — HIGH (ref 7–23)
CALCIUM SERPL-MCNC: 8.6 MG/DL — SIGNIFICANT CHANGE UP (ref 8.5–10.1)
CHLORIDE SERPL-SCNC: 109 MMOL/L — HIGH (ref 96–108)
CO2 SERPL-SCNC: 26 MMOL/L — SIGNIFICANT CHANGE UP (ref 22–31)
CREAT SERPL-MCNC: 1.84 MG/DL — HIGH (ref 0.5–1.3)
EGFR: 27 ML/MIN/1.73M2 — LOW
EOSINOPHIL # BLD AUTO: 0.06 K/UL — SIGNIFICANT CHANGE UP (ref 0–0.5)
EOSINOPHIL NFR BLD AUTO: 0.7 % — SIGNIFICANT CHANGE UP (ref 0–6)
FLUAV AG NPH QL: SIGNIFICANT CHANGE UP
FLUBV AG NPH QL: SIGNIFICANT CHANGE UP
GLUCOSE SERPL-MCNC: 98 MG/DL — SIGNIFICANT CHANGE UP (ref 70–99)
HCT VFR BLD CALC: 26.9 % — LOW (ref 34.5–45)
HGB BLD-MCNC: 8.9 G/DL — LOW (ref 11.5–15.5)
IMM GRANULOCYTES NFR BLD AUTO: 0.6 % — SIGNIFICANT CHANGE UP (ref 0–0.9)
INR BLD: 1.13 RATIO — SIGNIFICANT CHANGE UP (ref 0.85–1.16)
LACTATE SERPL-SCNC: 1.3 MMOL/L — SIGNIFICANT CHANGE UP (ref 0.7–2)
LYMPHOCYTES # BLD AUTO: 0.59 K/UL — LOW (ref 1–3.3)
LYMPHOCYTES # BLD AUTO: 7 % — LOW (ref 13–44)
MCHC RBC-ENTMCNC: 31 PG — SIGNIFICANT CHANGE UP (ref 27–34)
MCHC RBC-ENTMCNC: 33.1 G/DL — SIGNIFICANT CHANGE UP (ref 32–36)
MCV RBC AUTO: 93.7 FL — SIGNIFICANT CHANGE UP (ref 80–100)
MONOCYTES # BLD AUTO: 0.3 K/UL — SIGNIFICANT CHANGE UP (ref 0–0.9)
MONOCYTES NFR BLD AUTO: 3.5 % — SIGNIFICANT CHANGE UP (ref 2–14)
NEUTROPHILS # BLD AUTO: 7.44 K/UL — HIGH (ref 1.8–7.4)
NEUTROPHILS NFR BLD AUTO: 87.8 % — HIGH (ref 43–77)
NRBC # BLD: 0 /100 WBCS — SIGNIFICANT CHANGE UP (ref 0–0)
PLATELET # BLD AUTO: 143 K/UL — LOW (ref 150–400)
POTASSIUM SERPL-MCNC: 3.3 MMOL/L — LOW (ref 3.5–5.3)
POTASSIUM SERPL-SCNC: 3.3 MMOL/L — LOW (ref 3.5–5.3)
PROT SERPL-MCNC: 6 GM/DL — SIGNIFICANT CHANGE UP (ref 6–8.3)
PROTHROM AB SERPL-ACNC: 12.8 SEC — SIGNIFICANT CHANGE UP (ref 9.9–13.4)
RBC # BLD: 2.87 M/UL — LOW (ref 3.8–5.2)
RBC # FLD: 17.2 % — HIGH (ref 10.3–14.5)
SARS-COV-2 RNA SPEC QL NAA+PROBE: SIGNIFICANT CHANGE UP
SODIUM SERPL-SCNC: 143 MMOL/L — SIGNIFICANT CHANGE UP (ref 135–145)
TROPONIN I, HIGH SENSITIVITY RESULT: 159.6 NG/L — HIGH
WBC # BLD: 8.47 K/UL — SIGNIFICANT CHANGE UP (ref 3.8–10.5)
WBC # FLD AUTO: 8.47 K/UL — SIGNIFICANT CHANGE UP (ref 3.8–10.5)

## 2024-10-20 PROCEDURE — 99285 EMERGENCY DEPT VISIT HI MDM: CPT | Mod: 25

## 2024-10-20 PROCEDURE — 93010 ELECTROCARDIOGRAM REPORT: CPT

## 2024-10-20 PROCEDURE — 71045 X-RAY EXAM CHEST 1 VIEW: CPT | Mod: 26

## 2024-10-20 RX ORDER — SODIUM CHLORIDE 9 MG/ML
1000 INJECTION, SOLUTION INTRAMUSCULAR; INTRAVENOUS; SUBCUTANEOUS ONCE
Refills: 0 | Status: COMPLETED | OUTPATIENT
Start: 2024-10-20 | End: 2024-10-20

## 2024-10-20 RX ORDER — CEFTRIAXONE SODIUM 10 G
1000 VIAL (EA) INJECTION ONCE
Refills: 0 | Status: COMPLETED | OUTPATIENT
Start: 2024-10-20 | End: 2024-10-20

## 2024-10-20 RX ORDER — AZITHROMYCIN DIHYDRATE 200 MG/5ML
500 POWDER, FOR SUSPENSION ORAL ONCE
Refills: 0 | Status: COMPLETED | OUTPATIENT
Start: 2024-10-20 | End: 2024-10-20

## 2024-10-20 RX ORDER — ACETAMINOPHEN 500 MG
650 TABLET ORAL ONCE
Refills: 0 | Status: COMPLETED | OUTPATIENT
Start: 2024-10-20 | End: 2024-10-20

## 2024-10-20 RX ADMIN — SODIUM CHLORIDE 1000 MILLILITER(S): 9 INJECTION, SOLUTION INTRAMUSCULAR; INTRAVENOUS; SUBCUTANEOUS at 23:23

## 2024-10-20 RX ADMIN — Medication 650 MILLIGRAM(S): at 23:20

## 2024-10-20 RX ADMIN — Medication 100 MILLIGRAM(S): at 23:18

## 2024-10-20 RX ADMIN — AZITHROMYCIN DIHYDRATE 255 MILLIGRAM(S): 200 POWDER, FOR SUSPENSION ORAL at 23:46

## 2024-10-20 NOTE — ED ADULT NURSE NOTE - OBJECTIVE STATEMENT
Pt AOX4 French speaking with daughter at bedside BIBA due to sudden development of SOB 30 minutes ago and shivering.  Patient placed on bedside cardiac monitor. Pt states she has left sided 8/10 abdominal pain and feels "bloated". Pt denies any chest pain at this time. pt placed o 3L NC by EMS. 18 G in the right AC placed by EMS. Pt saturating 100% room air on 3L.  hx HTN , renal disease, has a fistula left arm but never have Dialysis

## 2024-10-20 NOTE — ED ADULT NURSE NOTE - NSFALLFACTORS_ED_ALL_ED
Denies chronic illness or hospitalizations.  No smoking in household.  Born full term.  Immunizations up to date.  No special diet.    NPO after midnight.  Parents to bring insurance info and drivers license.  Wear comfortable clean clothing.  Do not bring jewelry.  Shower or bathe night before or morning of surgery with liquid antibacterial soap.  Bring list of medications with dosage and how often taken.  Follow all instructions given by your physician.  Child may bring comfort item - [place], stuffed animal, doll baby.  If adult accompanying patient is not parent please bring any legal guardianship papers.  Call [phone] for any questions      Other

## 2024-10-20 NOTE — ED ADULT NURSE NOTE - NSFALLHARMRISKINTERV_ED_ALL_ED
Assistance OOB with selected safe patient handling equipment if applicable/Communicate risk of Fall with Harm to all staff, patient, and family/Provide visual cue: red socks, yellow wristband, yellow gown, etc/Reinforce activity limits and safety measures with patient and family/Bed in lowest position, wheels locked, appropriate side rails in place/Call bell, personal items and telephone in reach/Instruct patient to call for assistance before getting out of bed/chair/stretcher/Non-slip footwear applied when patient is off stretcher/[place] to call system/Physically safe environment - no spills, clutter or unnecessary equipment/Purposeful Proactive Rounding/Room/bathroom lighting operational, light cord in reach Assistance OOB with selected safe patient handling equipment if applicable/Communicate risk of Fall with Harm to all staff, patient, and family/Provide patient with walking aids/Provide visual cue: red socks, yellow wristband, yellow gown, etc/Reinforce activity limits and safety measures with patient and family/Bed in lowest position, wheels locked, appropriate side rails in place/Call bell, personal items and telephone in reach/Instruct patient to call for assistance before getting out of bed/chair/stretcher/Non-slip footwear applied when patient is off stretcher/[place] to call system/Physically safe environment - no spills, clutter or unnecessary equipment/Purposeful Proactive Rounding/Room/bathroom lighting operational, light cord in reach

## 2024-10-20 NOTE — ED ADULT TRIAGE NOTE - CHIEF COMPLAINT QUOTE
suddenly developed sob 30 minutes ago and shivering saturation 95 on RA , fever noticed at triage hx HTN , renal disease, has a fistula left arm but never have Dialysis

## 2024-10-21 DIAGNOSIS — R10.9 UNSPECIFIED ABDOMINAL PAIN: ICD-10-CM

## 2024-10-21 DIAGNOSIS — I10 ESSENTIAL (PRIMARY) HYPERTENSION: ICD-10-CM

## 2024-10-21 DIAGNOSIS — E11.9 TYPE 2 DIABETES MELLITUS WITHOUT COMPLICATIONS: ICD-10-CM

## 2024-10-21 DIAGNOSIS — R65.10 SYSTEMIC INFLAMMATORY RESPONSE SYNDROME (SIRS) OF NON-INFECTIOUS ORIGIN WITHOUT ACUTE ORGAN DYSFUNCTION: ICD-10-CM

## 2024-10-21 LAB
ALBUMIN SERPL ELPH-MCNC: 2.9 G/DL — LOW (ref 3.3–5)
ALP SERPL-CCNC: 51 U/L — SIGNIFICANT CHANGE UP (ref 40–120)
ALT FLD-CCNC: 19 U/L — SIGNIFICANT CHANGE UP (ref 12–78)
ANION GAP SERPL CALC-SCNC: 5 MMOL/L — SIGNIFICANT CHANGE UP (ref 5–17)
APPEARANCE UR: CLEAR — SIGNIFICANT CHANGE UP
AST SERPL-CCNC: 43 U/L — HIGH (ref 15–37)
BACTERIA # UR AUTO: NEGATIVE /HPF — SIGNIFICANT CHANGE UP
BILIRUB SERPL-MCNC: 0.6 MG/DL — SIGNIFICANT CHANGE UP (ref 0.2–1.2)
BILIRUB UR-MCNC: NEGATIVE — SIGNIFICANT CHANGE UP
BUN SERPL-MCNC: 36 MG/DL — HIGH (ref 7–23)
CALCIUM SERPL-MCNC: 8.4 MG/DL — LOW (ref 8.5–10.1)
CHLORIDE SERPL-SCNC: 110 MMOL/L — HIGH (ref 96–108)
CO2 SERPL-SCNC: 26 MMOL/L — SIGNIFICANT CHANGE UP (ref 22–31)
COLOR SPEC: YELLOW — SIGNIFICANT CHANGE UP
CREAT SERPL-MCNC: 2.04 MG/DL — HIGH (ref 0.5–1.3)
DIFF PNL FLD: NEGATIVE — SIGNIFICANT CHANGE UP
EGFR: 23 ML/MIN/1.73M2 — LOW
EPI CELLS # UR: SIGNIFICANT CHANGE UP
GLUCOSE BLDC GLUCOMTR-MCNC: 104 MG/DL — HIGH (ref 70–99)
GLUCOSE BLDC GLUCOMTR-MCNC: 83 MG/DL — SIGNIFICANT CHANGE UP (ref 70–99)
GLUCOSE BLDC GLUCOMTR-MCNC: 83 MG/DL — SIGNIFICANT CHANGE UP (ref 70–99)
GLUCOSE BLDC GLUCOMTR-MCNC: 99 MG/DL — SIGNIFICANT CHANGE UP (ref 70–99)
GLUCOSE SERPL-MCNC: 88 MG/DL — SIGNIFICANT CHANGE UP (ref 70–99)
GLUCOSE UR QL: NEGATIVE MG/DL — SIGNIFICANT CHANGE UP
HCT VFR BLD CALC: 27.1 % — LOW (ref 34.5–45)
HGB BLD-MCNC: 8.8 G/DL — LOW (ref 11.5–15.5)
KETONES UR-MCNC: NEGATIVE MG/DL — SIGNIFICANT CHANGE UP
LEUKOCYTE ESTERASE UR-ACNC: ABNORMAL
LIDOCAIN IGE QN: 51 U/L — SIGNIFICANT CHANGE UP (ref 13–75)
MCHC RBC-ENTMCNC: 30.6 PG — SIGNIFICANT CHANGE UP (ref 27–34)
MCHC RBC-ENTMCNC: 32.5 G/DL — SIGNIFICANT CHANGE UP (ref 32–36)
MCV RBC AUTO: 94.1 FL — SIGNIFICANT CHANGE UP (ref 80–100)
NITRITE UR-MCNC: POSITIVE
NRBC # BLD: 0 /100 WBCS — SIGNIFICANT CHANGE UP (ref 0–0)
PH UR: 6 — SIGNIFICANT CHANGE UP (ref 5–8)
PLATELET # BLD AUTO: 127 K/UL — LOW (ref 150–400)
POTASSIUM SERPL-MCNC: 3.2 MMOL/L — LOW (ref 3.5–5.3)
POTASSIUM SERPL-SCNC: 3.2 MMOL/L — LOW (ref 3.5–5.3)
PROT SERPL-MCNC: 5.6 GM/DL — LOW (ref 6–8.3)
PROT UR-MCNC: NEGATIVE MG/DL — SIGNIFICANT CHANGE UP
RBC # BLD: 2.88 M/UL — LOW (ref 3.8–5.2)
RBC # FLD: 17.4 % — HIGH (ref 10.3–14.5)
RBC CASTS # UR COMP ASSIST: 0 /HPF — SIGNIFICANT CHANGE UP (ref 0–4)
SODIUM SERPL-SCNC: 141 MMOL/L — SIGNIFICANT CHANGE UP (ref 135–145)
SP GR SPEC: 1.01 — SIGNIFICANT CHANGE UP (ref 1–1.03)
TROPONIN I, HIGH SENSITIVITY RESULT: 1051.1 NG/L — HIGH
TROPONIN I, HIGH SENSITIVITY RESULT: 1114.3 NG/L — HIGH
UROBILINOGEN FLD QL: 0.2 MG/DL — SIGNIFICANT CHANGE UP (ref 0.2–1)
WBC # BLD: 10.82 K/UL — HIGH (ref 3.8–10.5)
WBC # FLD AUTO: 10.82 K/UL — HIGH (ref 3.8–10.5)
WBC UR QL: 1 /HPF — SIGNIFICANT CHANGE UP (ref 0–5)

## 2024-10-21 PROCEDURE — 74177 CT ABD & PELVIS W/CONTRAST: CPT | Mod: 26,MC

## 2024-10-21 PROCEDURE — 71275 CT ANGIOGRAPHY CHEST: CPT | Mod: 26,MC

## 2024-10-21 PROCEDURE — 99222 1ST HOSP IP/OBS MODERATE 55: CPT

## 2024-10-21 PROCEDURE — 93010 ELECTROCARDIOGRAM REPORT: CPT

## 2024-10-21 PROCEDURE — 99223 1ST HOSP IP/OBS HIGH 75: CPT

## 2024-10-21 RX ORDER — LEVOTHYROXINE SODIUM 88 MCG
25 TABLET ORAL DAILY
Refills: 0 | Status: DISCONTINUED | OUTPATIENT
Start: 2024-10-21 | End: 2024-10-24

## 2024-10-21 RX ORDER — FUROSEMIDE 40 MG
40 TABLET ORAL
Refills: 0 | Status: DISCONTINUED | OUTPATIENT
Start: 2024-10-21 | End: 2024-10-23

## 2024-10-21 RX ORDER — MELATONIN 5 MG
3 TABLET ORAL AT BEDTIME
Refills: 0 | Status: DISCONTINUED | OUTPATIENT
Start: 2024-10-21 | End: 2024-10-24

## 2024-10-21 RX ORDER — DICYCLOMINE HYDROCHLORIDE 20 MG/1
20 TABLET ORAL
Refills: 0 | Status: DISCONTINUED | OUTPATIENT
Start: 2024-10-21 | End: 2024-10-21

## 2024-10-21 RX ORDER — MAGNESIUM, ALUMINUM HYDROXIDE 200-200 MG
30 TABLET,CHEWABLE ORAL EVERY 4 HOURS
Refills: 0 | Status: DISCONTINUED | OUTPATIENT
Start: 2024-10-21 | End: 2024-10-24

## 2024-10-21 RX ORDER — AMLODIPINE BESYLATE 10 MG
5 TABLET ORAL DAILY
Refills: 0 | Status: DISCONTINUED | OUTPATIENT
Start: 2024-10-21 | End: 2024-10-24

## 2024-10-21 RX ORDER — PIPERACILLIN AND TAZOBACTAM .5; 4 G/20ML; G/20ML
3.38 INJECTION, POWDER, LYOPHILIZED, FOR SOLUTION INTRAVENOUS ONCE
Refills: 0 | Status: COMPLETED | OUTPATIENT
Start: 2024-10-21 | End: 2024-10-21

## 2024-10-21 RX ORDER — NYSTATIN 100000 U/G
1 POWDER TOPICAL
Refills: 0 | Status: DISCONTINUED | OUTPATIENT
Start: 2024-10-21 | End: 2024-10-24

## 2024-10-21 RX ORDER — SODIUM CHLORIDE 9 MG/ML
1000 INJECTION, SOLUTION INTRAMUSCULAR; INTRAVENOUS; SUBCUTANEOUS
Refills: 0 | Status: DISCONTINUED | OUTPATIENT
Start: 2024-10-21 | End: 2024-10-22

## 2024-10-21 RX ORDER — ACETAMINOPHEN 500 MG
650 TABLET ORAL EVERY 6 HOURS
Refills: 0 | Status: DISCONTINUED | OUTPATIENT
Start: 2024-10-21 | End: 2024-10-24

## 2024-10-21 RX ORDER — ONDANSETRON HYDROCHLORIDE 2 MG/ML
4 INJECTION, SOLUTION INTRAMUSCULAR; INTRAVENOUS EVERY 8 HOURS
Refills: 0 | Status: DISCONTINUED | OUTPATIENT
Start: 2024-10-21 | End: 2024-10-24

## 2024-10-21 RX ORDER — PANTOPRAZOLE SODIUM 40 MG/1
40 TABLET, DELAYED RELEASE ORAL
Refills: 0 | Status: DISCONTINUED | OUTPATIENT
Start: 2024-10-21 | End: 2024-10-24

## 2024-10-21 RX ORDER — CARVEDILOL 25 MG/1
12.5 TABLET, FILM COATED ORAL EVERY 12 HOURS
Refills: 0 | Status: DISCONTINUED | OUTPATIENT
Start: 2024-10-21 | End: 2024-10-24

## 2024-10-21 RX ORDER — HEPARIN SODIUM 10000 [USP'U]/ML
5000 INJECTION INTRAVENOUS; SUBCUTANEOUS EVERY 12 HOURS
Refills: 0 | Status: DISCONTINUED | OUTPATIENT
Start: 2024-10-21 | End: 2024-10-24

## 2024-10-21 RX ORDER — POTASSIUM CHLORIDE 10 MEQ
20 TABLET, EXTENDED RELEASE ORAL ONCE
Refills: 0 | Status: COMPLETED | OUTPATIENT
Start: 2024-10-21 | End: 2024-10-21

## 2024-10-21 RX ORDER — MEMANTINE HYDROCHLORIDE 21 MG/1
10 CAPSULE, EXTENDED RELEASE ORAL DAILY
Refills: 0 | Status: DISCONTINUED | OUTPATIENT
Start: 2024-10-21 | End: 2024-10-24

## 2024-10-21 RX ORDER — ALLOPURINOL 100 MG
50 TABLET ORAL
Refills: 0 | Status: DISCONTINUED | OUTPATIENT
Start: 2024-10-21 | End: 2024-10-24

## 2024-10-21 RX ORDER — PIPERACILLIN AND TAZOBACTAM .5; 4 G/20ML; G/20ML
3.38 INJECTION, POWDER, LYOPHILIZED, FOR SOLUTION INTRAVENOUS EVERY 8 HOURS
Refills: 0 | Status: DISCONTINUED | OUTPATIENT
Start: 2024-10-21 | End: 2024-10-24

## 2024-10-21 RX ORDER — CLOPIDOGREL 75 MG/1
75 TABLET ORAL DAILY
Refills: 0 | Status: DISCONTINUED | OUTPATIENT
Start: 2024-10-21 | End: 2024-10-24

## 2024-10-21 RX ORDER — POTASSIUM CHLORIDE 10 MEQ
40 TABLET, EXTENDED RELEASE ORAL EVERY 4 HOURS
Refills: 0 | Status: COMPLETED | OUTPATIENT
Start: 2024-10-21 | End: 2024-10-21

## 2024-10-21 RX ORDER — GLUCAGON INJECTION, SOLUTION 1 MG/.2ML
1 INJECTION, SOLUTION SUBCUTANEOUS ONCE
Refills: 0 | Status: DISCONTINUED | OUTPATIENT
Start: 2024-10-21 | End: 2024-10-24

## 2024-10-21 RX ORDER — MONTELUKAST SODIUM 10 MG/1
10 TABLET, FILM COATED ORAL DAILY
Refills: 0 | Status: DISCONTINUED | OUTPATIENT
Start: 2024-10-21 | End: 2024-10-24

## 2024-10-21 RX ORDER — INSULIN LISPRO 100/ML
VIAL (ML) SUBCUTANEOUS EVERY 6 HOURS
Refills: 0 | Status: DISCONTINUED | OUTPATIENT
Start: 2024-10-21 | End: 2024-10-24

## 2024-10-21 RX ADMIN — PIPERACILLIN AND TAZOBACTAM 200 GRAM(S): .5; 4 INJECTION, POWDER, LYOPHILIZED, FOR SOLUTION INTRAVENOUS at 03:26

## 2024-10-21 RX ADMIN — NYSTATIN 1 APPLICATION(S): 100000 POWDER TOPICAL at 16:08

## 2024-10-21 RX ADMIN — Medication 50 MILLIGRAM(S): at 16:07

## 2024-10-21 RX ADMIN — Medication 20 MILLIEQUIVALENT(S): at 06:07

## 2024-10-21 RX ADMIN — Medication 40 MILLIGRAM(S): at 15:14

## 2024-10-21 RX ADMIN — MONTELUKAST SODIUM 10 MILLIGRAM(S): 10 TABLET, FILM COATED ORAL at 15:14

## 2024-10-21 RX ADMIN — PIPERACILLIN AND TAZOBACTAM 25 GRAM(S): .5; 4 INJECTION, POWDER, LYOPHILIZED, FOR SOLUTION INTRAVENOUS at 21:11

## 2024-10-21 RX ADMIN — Medication 650 MILLIGRAM(S): at 00:20

## 2024-10-21 RX ADMIN — MEMANTINE HYDROCHLORIDE 10 MILLIGRAM(S): 21 CAPSULE, EXTENDED RELEASE ORAL at 15:14

## 2024-10-21 RX ADMIN — PIPERACILLIN AND TAZOBACTAM 25 GRAM(S): .5; 4 INJECTION, POWDER, LYOPHILIZED, FOR SOLUTION INTRAVENOUS at 15:16

## 2024-10-21 RX ADMIN — CARVEDILOL 12.5 MILLIGRAM(S): 25 TABLET, FILM COATED ORAL at 18:00

## 2024-10-21 RX ADMIN — Medication 40 MILLIEQUIVALENT(S): at 18:00

## 2024-10-21 RX ADMIN — CLOPIDOGREL 75 MILLIGRAM(S): 75 TABLET ORAL at 15:15

## 2024-10-21 RX ADMIN — SODIUM CHLORIDE 60 MILLILITER(S): 9 INJECTION, SOLUTION INTRAMUSCULAR; INTRAVENOUS; SUBCUTANEOUS at 10:20

## 2024-10-21 RX ADMIN — HEPARIN SODIUM 5000 UNIT(S): 10000 INJECTION INTRAVENOUS; SUBCUTANEOUS at 17:59

## 2024-10-21 RX ADMIN — Medication 40 MILLIEQUIVALENT(S): at 21:11

## 2024-10-21 NOTE — PATIENT PROFILE ADULT - VISION (WITH CORRECTIVE LENSES IF THE PATIENT USUALLY WEARS THEM):
Chief Complaint  Diarrhea (UPSET STOMACH SINCE Sunday, BUT BETTER TODAY) and Vomiting (ALL DAY Sunday, BUT NONE TODAY)    Subjective          Leonila Recinos presents to [facility]  Here with 2 days of nausea, vomiting, NBNB, loose stools, no blood in stool; drinking well, urinating well      Objective   Vital Signs:   BP 96/60   Pulse 93   Temp 97.3 °F (36.3 °C)   Resp 20   Wt 38 kg (83 lb 12.8 oz)   SpO2 94%     Physical Exam  Constitutional:       General: She is active.      Appearance: Normal appearance. She is normal weight.   HENT:      Head: Normocephalic and atraumatic.      Right Ear: External ear normal.      Left Ear: External ear normal.      Nose: Nose normal.   Cardiovascular:      Rate and Rhythm: Normal rate and regular rhythm.   Pulmonary:      Effort: Pulmonary effort is normal.      Breath sounds: Normal breath sounds.   Abdominal:      General: Abdomen is flat. Bowel sounds are normal. There is no distension.      Palpations: Abdomen is soft.      Tenderness: There is no abdominal tenderness.   Neurological:      Mental Status: She is alert.   Psychiatric:         Mood and Affect: Mood normal.         Behavior: Behavior normal.         Thought Content: Thought content normal.         Judgment: Judgment normal.        Result Review :                 Assessment and Plan    Diagnoses and all orders for this visit:    1. Viral gastroenteritis (Primary)    - discussed supportive care, fluids, hydration  - tylenol as needed for fevers, pain  - rtc worsening, change in illness      Follow Up   No follow-ups on file.  Patient was given instructions and counseling regarding her condition or for health maintenance advice. Please see specific information pulled into the AVS if appropriate.        Normal vision: sees adequately in most situations; can see medication labels, newsprint

## 2024-10-21 NOTE — H&P ADULT - ASSESSMENT
84 year old female with a PMH of DM, HTN, Gout, CVA (R-hemiplegia), Hypothyroidism, GERD, CDK, stress incontinence presents to the ED for acute onset of SOB associated with chills & shivering started 30 minutes prior to ED arrival. Also endorses LUQ abdominal pain with “bloating” sensation. Patient became mildly hypoxic in RA saturating 92% & was placed on 2L-NC. In the ED patient met SIRS criteria with Fever T-max: 101.4, Tachycardia- HR:126, Tachypneic- HR:23. Received 1L-NS, Ceftriaxone, Azithromycin, Zosyn and Tylenol.    84 year old Jordanian speaking  female with a PMH of DM, HTN, Gout, CVA (R-hemiplegia), Hypothyroidism, GERD, CDK, stress incontinence presents to the ED for acute onset of SOB associated with chills & shivering started 30 minutes prior to ED arrival. Also endorses LUQ abdominal pain with “bloating” sensation. Patient became mildly hypoxic in RA saturating 92% & was placed on 2L-NC. In the ED patient met SIRS criteria with Fever T-max: 101.4, Tachycardia- HR:126, Tachypneic- HR:23. Received 1L-NS, Ceftriaxone, Azithromycin, Zosyn and Tylenol.

## 2024-10-21 NOTE — CONSULT NOTE ADULT - SUBJECTIVE AND OBJECTIVE BOX
CARDIOLOGY CONSULTATION NOTE                                                                             J LUIS MCGUIRE is a 84y Female  with a PMH of DM, HTN, Gout, CVA (R-hemiplegia), Hypothyroidism, GERD, CDK, stress incontinence presents to the ED for acute onset of SOB associated with chills & shivering started 30 minutes prior to ED arrival. Also endorses LUQ abdominal pain with “bloating” sensation. Patient became mildly hypoxic in RA saturating 92% & was placed on 2L-NC. In the ED patient met SIRS criteria with Fever T-max: 101.4, Tachycardia- HR:126, Tachypneic- HR:23. Received 1L-NS, Ceftriaxone, Azithromycin, Zosyn and Tylenol.  CT-ABD: Mild intrahepatic and extra hepatic biliary ductal dilatation with common bile duct tapering distally towards the ampulla and mild diffuse pancreatic ductal dilatation without obstructing radiopaque stone or mass identified. 1.1 cm irregular low-density lesion in the pancreatic tail with mild surrounding inflammatory changes  (21 Oct 2024 07:19)   REVIEW OF SYSTEMS: -----------------------------  CONSTITUTIONAL: No fever, weight loss, or fatigue EYES: No eye pain, visual disturbances, or discharge ENMT:  No difficulty hearing, tinnitus, vertigo; No sinus or throat pain NECK: No pain or stiffness BREASTS: No pain, masses, or nipple discharge RESPIRATORY: No cough, wheezing, chills or hemoptysis; No shortness of breath CARDIOVASCULAR: See HPI GASTROINTESTINAL: No abdominal or epigastric pain. No nausea, vomiting, or hematemesis; No diarrhea or constipation. No melena or hematochezia. GENITOURINARY: No dysuria, frequency, hematuria, or incontinence NEUROLOGICAL: No headaches, memory loss, loss of strength, numbness, or tremors SKIN: No itching, burning, rashes, or lesions  LYMPH NODES: No enlarged glands ENDOCRINE: No heat or cold intolerance; No hair loss MUSCULOSKELETAL: No joint pain or swelling; No muscle, back, or extremity pain PSYCHIATRIC: No depression, anxiety, mood swings, or difficulty sleeping HEME/LYMPH: No easy bruising, or bleeding gums ALLERGY AND IMMUNOLOGIC: No hives or eczema  Home Medications: acetaminophen 325 mg oral tablet: 2 tab(s) orally every 6 hours, As needed, Moderate Pain (4 - 6) (21 Oct 2024 07:08) allopurinol 300 mg oral tablet: 1 tab(s) orally once a day (21 Oct 2024 07:08) amLODIPine 5 mg oral tablet: 1 tab(s) orally once a day (21 Oct 2024 07:08) carvedilol 12.5 mg oral tablet: 1 tab(s) orally every 12 hours (21 Oct 2024 07:08) clopidogrel 75 mg oral tablet: 1 tab(s) orally once a day (21 Oct 2024 07:08) fenofibrate 134 mg oral capsule: 1 cap(s) orally once a day (21 Oct 2024 07:08) furosemide 40 mg oral tablet: 1.5 tab(s) orally 2 times a day (21 Oct 2024 07:08) levothyroxine 25 mcg (0.025 mg) oral tablet: 1 tab(s) orally once a day (21 Oct 2024 07:08) montelukast 10 mg oral tablet: 1 tab(s) orally once a day (21 Oct 2024 07:08) Namenda 10 mg oral tablet: 1 tab(s) orally once a day (21 Oct 2024 07:08) omeprazole 40 mg oral delayed release capsule: 1 cap(s) orally once a day (21 Oct 2024 07:08) oxybutynin 10 mg/24 hr oral tablet, extended release: 1 tab(s) orally once a day (21 Oct 2024 07:08)   MEDICATIONS  (STANDING): allopurinol 50 milliGRAM(s) Oral <User Schedule> amLODIPine   Tablet 5 milliGRAM(s) Oral daily carvedilol 12.5 milliGRAM(s) Oral every 12 hours clopidogrel Tablet 75 milliGRAM(s) Oral daily dextrose 5%. 1000 milliLiter(s) (50 mL/Hr) IV Continuous <Continuous> dextrose 50% Injectable 25 Gram(s) IV Push once furosemide    Tablet 40 milliGRAM(s) Oral two times a day glucagon  Injectable 1 milliGRAM(s) IntraMuscular once heparin   Injectable 5000 Unit(s) SubCutaneous every 12 hours insulin lispro (ADMELOG) corrective regimen sliding scale   SubCutaneous every 6 hours levothyroxine 25 MICROGram(s) Oral daily memantine 10 milliGRAM(s) Oral daily montelukast 10 milliGRAM(s) Oral daily nystatin Powder 1 Application(s) Topical two times a day pantoprazole    Tablet 40 milliGRAM(s) Oral before breakfast piperacillin/tazobactam IVPB.. 3.375 Gram(s) IV Intermittent every 8 hours sodium chloride 0.9%. 1000 milliLiter(s) (60 mL/Hr) IV Continuous <Continuous>   ALLERGIES: No Known Allergies   FAMILY HISTORY: Family history of heart disease sister  Family history of heart attack sister    PHYSICAL EXAMINATION: ----------------------------- T(C): 36.2 (10-21-24 @ 12:45), Max: 38.6 (10-20-24 @ 22:06) HR: 73 (10-21-24 @ 12:45) (72 - 126) BP: 126/77 (10-21-24 @ 12:45) (114/45 - 168/84) RR: 18 (10-21-24 @ 12:45) (17 - 28) SpO2: 99% (10-21-24 @ 12:45) (91% - 100%) Wt(kg): --  Height (cm): 152.4 (10-21 @ 09:54) Weight (kg): 72 (10-21 @ 09:54) BMI (kg/m2): 31 (10-21 @ 09:54) BSA (m2): 1.69 (10-21 @ 09:54)  Constitutional: well developed, normal appearance, well groomed, well nourished, no deformities and no acute distress.  Eyes: the conjunctiva exhibited no abnormalities and the eyelids demonstrated no xanthelasmas.  HEENT: normal oral mucosa, no oral pallor and no oral cyanosis.  Neck: normal jugular venous A waves present, normal jugular venous V waves present and no jugular venous lanza A waves.  Pulmonary: no respiratory distress, normal respiratory rhythm and effort, no accessory muscle use and lungs were clear to auscultation bilaterally.  Cardiovascular: heart rate and rhythm were normal, normal S1 and S2 and no murmur, gallop, rub, heave or thrill are present.  Abdomen: soft, non-tender, no hepato-splenomegaly and no abdominal mass palpated.  Musculoskeletal: the gait could not be assessed..  Extremities: no clubbing of the fingernails, no localized cyanosis, no petechial hemorrhages and no ischemic changes.  Skin: normal skin color and pigmentation, no rash, no venous stasis, no skin lesions, no skin ulcer and no xanthoma was observed.  Psychiatric: oriented to person, place, and time, the affect was normal, the mood was normal and not feeling anxious.   ECG: -------  < from: 12 Lead ECG (10.21.24 @ 12:04) >  Ventricular Rate 63 BPM  Atrial Rate 63 BPM  P-R Interval 150 ms  QRS Duration 82 ms  Q-T Interval 472 ms  QTC Calculation(Bazett) 483 ms  P Axis 1 degrees  R Axis -7 degrees  T Axis 16 degrees  Diagnosis Line Normal sinus rhythm Nonspecific ST abnormality Abnormal ECG When compared with ECG of 20-OCT-2024 23:56, premature atrial complexes are no longer present Confirmed by HAL MIX MD ([id number]) on 10/21/2024 1:16:44 PM  < end of copied text >   LABS:  -------- 10-20  143  |  109[H]  |  36[H] ----------------------------<  98 3.3[L]   |  26  |  1.84[H]  Ca    8.6      20 Oct 2024 22:50  TPro  6.0  /  Alb  3.1[L]  /  TBili  0.7  /  DBili  x   /  AST  41[H]  /  ALT  20  /  AlkPhos  80  10-20                       8.9   8.47  )-----------( 143      ( 20 Oct 2024 22:50 )            26.9    PT/INR - ( 20 Oct 2024 22:50 )   PT: 12.8 sec;   INR: 1.13 ratio      PTT - ( 20 Oct 2024 22:50 )  PTT:36.8 sec   Troponin I, High Sensitivity Result: 1051.1: (10.21.24 @ 10:48)  Troponin I, High Sensitivity Result: 1114.3:  (10.21.24 @ 06:05)  Troponin I, High Sensitivity Result: 159.6: (10.20.24 @ 22:50)    RADIOLOGY REPORTS: -----------------------------  < from: CT Angio Chest PE Protocol w/ IV Cont (10.21.24 @ 01:36) > IMPRESSION: Evaluation is limited to the segmental level in the lower lobes due to  motion artifact. Within this limitation, no pulmonary embolism is  identified.  Cholelithiasis and partially visualized dilated common bile duct.  Recommend clinical correlation, consider further evaluation with  ultrasound as clinically indicated.  < end of copied text >   ECHOCARDIOGRAM: ---------------------------   < from: Transthoracic Echocardiogram (08.08.16 @ 16:42) > Conclusions: 1. Normal left ventricular internal dimensions and wall thicknesses. 2. Hyperdynamic left ventricle. ------------------------------------------  < end of copied text >    CARDIOLOGY CONSULTATION NOTE                                                                             J LUIS MCGUIRE is a 84y Female  with a PMH of DM, HTN, Gout, CVA (R-hemiplegia), Hypothyroidism, GERD, CDK, stress incontinence presents to the ED for acute onset of SOB associated with chills & shivering started 30 minutes prior to ED arrival. Also endorses LUQ abdominal pain with “bloating” sensation. Patient became mildly hypoxic in RA saturating 92% & was placed on 2L-NC. In the ED patient met SIRS criteria with Fever T-max: 101.4, Tachycardia- HR:126, Tachypneic- HR:23. Received 1L-NS, Ceftriaxone, Azithromycin, Zosyn and Tylenol.  CT-ABD: Mild intrahepatic and extra hepatic biliary ductal dilatation with common bile duct tapering distally towards the ampulla and mild diffuse pancreatic ductal dilatation without obstructing radiopaque stone or mass identified. 1.1 cm irregular low-density lesion in the pancreatic tail with mild surrounding inflammatory changes    REVIEW OF SYSTEMS: NA -----------------------------  CONSTITUTIONAL: Patient lethargic, not answering questions  Home Medications: acetaminophen 325 mg oral tablet: 2 tab(s) orally every 6 hours, As needed, Moderate Pain (4 - 6) (21 Oct 2024 07:08) allopurinol 300 mg oral tablet: 1 tab(s) orally once a day (21 Oct 2024 07:08) amLODIPine 5 mg oral tablet: 1 tab(s) orally once a day (21 Oct 2024 07:08) carvedilol 12.5 mg oral tablet: 1 tab(s) orally every 12 hours (21 Oct 2024 07:08) clopidogrel 75 mg oral tablet: 1 tab(s) orally once a day (21 Oct 2024 07:08) fenofibrate 134 mg oral capsule: 1 cap(s) orally once a day (21 Oct 2024 07:08) furosemide 40 mg oral tablet: 1.5 tab(s) orally 2 times a day (21 Oct 2024 07:08) levothyroxine 25 mcg (0.025 mg) oral tablet: 1 tab(s) orally once a day (21 Oct 2024 07:08) montelukast 10 mg oral tablet: 1 tab(s) orally once a day (21 Oct 2024 07:08) Namenda 10 mg oral tablet: 1 tab(s) orally once a day (21 Oct 2024 07:08) omeprazole 40 mg oral delayed release capsule: 1 cap(s) orally once a day (21 Oct 2024 07:08) oxybutynin 10 mg/24 hr oral tablet, extended release: 1 tab(s) orally once a day (21 Oct 2024 07:08)   MEDICATIONS  (STANDING): allopurinol 50 milliGRAM(s) Oral <User Schedule> amLODIPine   Tablet 5 milliGRAM(s) Oral daily carvedilol 12.5 milliGRAM(s) Oral every 12 hours clopidogrel Tablet 75 milliGRAM(s) Oral daily dextrose 5%. 1000 milliLiter(s) (50 mL/Hr) IV Continuous <Continuous> dextrose 50% Injectable 25 Gram(s) IV Push once furosemide    Tablet 40 milliGRAM(s) Oral two times a day glucagon  Injectable 1 milliGRAM(s) IntraMuscular once heparin   Injectable 5000 Unit(s) SubCutaneous every 12 hours insulin lispro (ADMELOG) corrective regimen sliding scale   SubCutaneous every 6 hours levothyroxine 25 MICROGram(s) Oral daily memantine 10 milliGRAM(s) Oral daily montelukast 10 milliGRAM(s) Oral daily nystatin Powder 1 Application(s) Topical two times a day pantoprazole    Tablet 40 milliGRAM(s) Oral before breakfast piperacillin/tazobactam IVPB.. 3.375 Gram(s) IV Intermittent every 8 hours sodium chloride 0.9%. 1000 milliLiter(s) (60 mL/Hr) IV Continuous <Continuous>   ALLERGIES: No Known Allergies   FAMILY HISTORY: Family history of heart disease sister  Family history of heart attack sister    PHYSICAL EXAMINATION: ----------------------------- T(C): 36.2 (10-21-24 @ 12:45), Max: 38.6 (10-20-24 @ 22:06) HR: 73 (10-21-24 @ 12:45) (72 - 126) BP: 126/77 (10-21-24 @ 12:45) (114/45 - 168/84) RR: 18 (10-21-24 @ 12:45) (17 - 28) SpO2: 99% (10-21-24 @ 12:45) (91% - 100%) Wt(kg): --  Height (cm): 152.4 (10-21 @ 09:54) Weight (kg): 72 (10-21 @ 09:54) BMI (kg/m2): 31 (10-21 @ 09:54) BSA (m2): 1.69 (10-21 @ 09:54)  Constitutional: well developed, normal appearance, well groomed, well nourished, no deformities and no acute distress.  Eyes: the conjunctiva exhibited no abnormalities and the eyelids demonstrated no xanthelasmas.  HEENT: normal oral mucosa, no oral pallor and no oral cyanosis.  Neck: normal jugular venous A waves present, normal jugular venous V waves present and no jugular venous lanza A waves.  Pulmonary: no respiratory distress, normal respiratory rhythm and effort, no accessory muscle use and lungs were clear to auscultation bilaterally.  Cardiovascular: heart rate and rhythm were normal, normal S1 and S2 and no murmur, gallop, rub, heave or thrill are present.  Abdomen: soft, non-tender, no hepato-splenomegaly and no abdominal mass palpated.  Musculoskeletal: the gait could not be assessed..  Extremities: no clubbing of the fingernails, no localized cyanosis, no petechial hemorrhages and no ischemic changes.  Skin: normal skin color and pigmentation, no rash, no venous stasis, no skin lesions, no skin ulcer and no xanthoma was observed.  Psychiatric: oriented to person, place, and time, the affect was normal, the mood was normal and not feeling anxious.   ECG: ------- no evidence of active cardiac ischemia, continue < from: 12 Lead ECG (10.21.24 @ 12:04) >  Ventricular Rate 63 BPM  Atrial Rate 63 BPM  P-R Interval 150 ms  QRS Duration 82 ms  Q-T Interval 472 ms  QTC Calculation(Bazett) 483 ms  P Axis 1 degrees  R Axis -7 degrees  T Axis 16 degrees  Diagnosis Line Normal sinus rhythm Nonspecific ST abnormality Abnormal ECG When compared with ECG of 20-OCT-2024 23:56, premature atrial complexes are no longer present Confirmed by HAL MIX MD ([id number]) on 10/21/2024 1:16:44 PM  < end of copied text >   LABS:  -------- 10-20  143  |  109[H]  |  36[H] ----------------------------<  98 3.3[L]   |  26  |  1.84[H]  Ca    8.6      20 Oct 2024 22:50  TPro  6.0  /  Alb  3.1[L]  /  TBili  0.7  /  DBili  x   /  AST  41[H]  /  ALT  20  /  AlkPhos  80  10-20                       8.9   8.47  )-----------( 143      ( 20 Oct 2024 22:50 )            26.9    PT/INR - ( 20 Oct 2024 22:50 )   PT: 12.8 sec;   INR: 1.13 ratio      PTT - ( 20 Oct 2024 22:50 )  PTT:36.8 sec   Troponin I, High Sensitivity Result: 1051.1: (10.21.24 @ 10:48)  Troponin I, High Sensitivity Result: 1114.3:  (10.21.24 @ 06:05)  Troponin I, High Sensitivity Result: 159.6: (10.20.24 @ 22:50)    RADIOLOGY REPORTS: -----------------------------  < from: CT Angio Chest PE Protocol w/ IV Cont (10.21.24 @ 01:36) > IMPRESSION: Evaluation is limited to the segmental level in the lower lobes due to  motion artifact. Within this limitation, no pulmonary embolism is  identified.  Cholelithiasis and partially visualized dilated common bile duct.  Recommend clinical correlation, consider further evaluation with  ultrasound as clinically indicated.  < end of copied text >   ECHOCARDIOGRAM: ---------------------------   < from: Transthoracic Echocardiogram (08.08.16 @ 16:42) > Conclusions: 1. Normal left ventricular internal dimensions and wall thicknesses. 2. Hyperdynamic left ventricle. ------------------------------------------  < end of copied text >

## 2024-10-21 NOTE — ED PROVIDER NOTE - NS ED ROS FT
CONST: no fevers, no chills  EYES: no pain, no vision changes  ENT: no sore throat, no ear pain, no change in hearing  CV: no chest pain, no leg swelling  RESP: (+) shortness of breath, (+) cough  ABD: no abdominal pain, no nausea, no vomiting, no diarrhea  : no dysuria, no flank pain, no hematuria  MSK: no back pain, no extremity pain  NEURO: no headache or additional neurologic complaints  HEME: no easy bleeding  SKIN:  no rash

## 2024-10-21 NOTE — H&P ADULT - PROBLEM SELECTOR PLAN 2
Normotensive   Continue home meds   - Amlodipine 5mg  - Carvedilol 12.5mg CT-ABD: Mild intrahepatic and extra hepatic biliary ductal dilatation with common bile duct tapering distally towards the ampulla and mild diffuse pancreatic ductal dilatation without obstructing radiopaque stone or mass identified. 1.1 cm irregular low-density lesion in the pancreatic tail with mild surrounding inflammatory change  - NPO  - Pain Management   - MRCP   - GI Consult   - DVT Prophylaxis

## 2024-10-21 NOTE — ED PROVIDER NOTE - CLINICAL SUMMARY MEDICAL DECISION MAKING FREE TEXT BOX
A 84-year-old female with past medical history of hypertension and CKD diabetes presenting with shortness of breath for 1 hour.  Patient has been lethargic all day, woke up in the middle night with shortness of breath and chills.  Denies nausea vomiting headache.  Endorses mild abdominal discomfort.    Patient when initially came febrile tacky normotensive, satting low 90s and mildly tachypneic.  Sepsis protocol initiated, patient initially given ceftriaxone and azithromycin for presumed respiratory infection.  Given Tylenol and fluids.  Patient given nasal cannula 4 L with improvement of O2.  Vitals improved after intervention.  Troponin elevated 150s, patient not complaining of chest pain.  Likely elevated from demand from infection.  X-ray did not show clear pneumonia, CTA ordered to rule out pneumonia versus PE.  CTA was negative, however incidentally showed CBD dilatation.  No ultrasound available so CT ordered which showed biliary tract dilatation, patient covered with Zosyn for possible infection from obstruction.  Will admit to medicine for sepsis and possible GI consult for possible MRCP

## 2024-10-21 NOTE — CONSULT NOTE ADULT - SUBJECTIVE AND OBJECTIVE BOX
84F    PMH/PSH--  Hypertension  Chronic kidney disease, unspecified stage  Essential hypertension  Type 2 diabetes mellitus with stage 4 chronic kidney disease, unspecified long term insulin use status  Former smoker  Dyspepsia  S/P  section  H/O tubal ligation    Allergies--  No Known Allergies    Medications--  Other: acetaminophen     Tablet .. PRN  allopurinol  aluminum hydroxide/magnesium hydroxide/simethicone Suspension PRN  amLODIPine   Tablet  carvedilol  clopidogrel Tablet  dextrose 5%.  dextrose 50% Injectable  dextrose Oral Gel PRN  furosemide    Tablet  glucagon  Injectable  heparin   Injectable  insulin lispro (ADMELOG) corrective regimen sliding scale  levothyroxine  melatonin PRN  memantine  montelukast  nystatin Powder  ondansetron Injectable PRN  pantoprazole    Tablet  sodium chloride 0.9%.    Social History--  EtOH: denies   Tobacco: denies   Drug Use: denies     Family/Marital History--    Family history of heart disease  Family history of heart attack    Review of Systems:  A >=10-point review of systems was obtained.     Pertinent positives and negatives--  Constitutional: No fevers. No Chills. No Rigors.   Eyes:  ENMT:  Cardiovascular: No chest pain. No palpitations.  Respiratory: No shortness of breath. No cough.  Gastrointestinal: No nausea or vomiting. No diarrhea or constipation.   Genitourinary:  Musculoskeletal:  Skin:  Neurologic:  Psychiatric: Pleasant. Appropriate affect.  Endocrine:  Heme/Lymphatic:  Allergy/Immunologic:    Review of systems otherwise negative except as previously noted.    Physical Exam--  Vital Signs: T(F): 97.1 (10-21-24 @ 12:45), Max: 101.4 (10-20-24 @ 22:06)  HR: 73 (10-21-24 @ 12:45)  BP: 126/77 (10-21-24 @ 12:45)  RR: 18 (10-21-24 @ 12:45)  SpO2: 99% (10-21-24 @ 12:45)  Wt(kg): --  General: Nontoxic-appearing Female in no acute distress.  HEENT: AT/NC. PERRL. EOMI. Anicteric. Conjunctiva pink and moist. Oropharynx clear. Dentition fair.  Neck: Not rigid. No sense of mass.  Nodes: None palpable.  Lungs: Clear bilaterally without rales, wheezing or rhonchi  Heart: Regular rate and rhythm. No Murmur. No rub. No gallop. No palpable thrill.  Abdomen: Bowel sounds present and normoactive. Soft. Nondistended. Nontender. No sense of mass. No organomegaly.  Back: No spinal tenderness. No costovertebral angle tenderness.   Extremities: No cyanosis or clubbing. No edema.   Skin: Warm. Dry. Good turgor. No rash. No vasculitic stigmata.  Psychiatric: Appropriate affect and mood for situation.         Laboratory & Imaging Data--  CBC                        8.9    8.47  )-----------( 143      ( 20 Oct 2024 22:50 )             26.9       Chemistries  10-20    143  |  109[H]  |  36[H]  ----------------------------<  98  3.3[L]   |  26  |  1.84[H]    Ca    8.6      20 Oct 2024 22:50    TPro  6.0  /  Alb  3.1[L]  /  TBili  0.7  /  DBili  x   /  AST  41[H]  /  ALT  20  /  AlkPhos  80  10-20      Culture Data    Urinalysis with Rflx Culture (collected 21 Oct 2024 03:30)         84F DM, HTN, CKD presenting for evaluation of abdominal pain and bloating after a meal of bread and cheese for dinner. Has never had pain like this before. No N/V.   Currently still feels bloated.  No icterus or jaundice.   In ED, Tm 101.4, otherwise hemodynamically stable. LFTs normal.  CT as below. GI consulted for further evaluation.    PMH/PSH--  Hypertension  Chronic kidney disease, unspecified stage  Essential hypertension  Type 2 diabetes mellitus with stage 4 chronic kidney disease, unspecified long term insulin use status  Former smoker  Dyspepsia  S/P  section  H/O tubal ligation    Allergies--  No Known Allergies    Medications--  Other: acetaminophen     Tablet .. PRN  allopurinol  aluminum hydroxide/magnesium hydroxide/simethicone Suspension PRN  amLODIPine   Tablet  carvedilol  clopidogrel Tablet  dextrose 5%.  dextrose 50% Injectable  dextrose Oral Gel PRN  furosemide    Tablet  glucagon  Injectable  heparin   Injectable  insulin lispro (ADMELOG) corrective regimen sliding scale  levothyroxine  melatonin PRN  memantine  montelukast  nystatin Powder  ondansetron Injectable PRN  pantoprazole    Tablet  sodium chloride 0.9%.    Social History--  EtOH: denies   Tobacco: denies   Drug Use: denies     Family/Marital History--    Family history of heart disease  Family history of heart attack    Review of Systems:  A >=10-point review of systems was obtained.     Pertinent positives and negatives--  Constitutional: +fevers. No Chills. No Rigors.   Eyes: No visual sx or eye pain  ENMT: No hearing trouble or throat pain  Cardiovascular: No chest pain. No palpitations.  Respiratory: No shortness of breath. No cough.  Gastrointestinal: No nausea or vomiting. +abdominal pain  Musculoskeletal: No polyuria or polydipsia  Skin: No rash or lesion  Neurologic: No focal weakness or disorientation  Psychiatric: Pleasant. Appropriate affect.  Endocrine: No polyuria or polydipsia  Heme/Lymphatic: No easy bruising or immune issues    Review of systems otherwise negative except as previously noted.    Physical Exam--  Vital Signs: T(F): 97.1 (10-21-24 @ 12:45), Max: 101.4 (10-20-24 @ 22:06)  HR: 73 (10-21-24 @ 12:45)  BP: 126/77 (10-21-24 @ 12:45)  RR: 18 (10-21-24 @ 12:45)  SpO2: 99% (10-21-24 @ 12:45)    General: Nontoxic-appearing in no acute distress.  HEENT: AT/NC. Anicteric. Conjunctiva pink and moist.   Neck: Not rigid. No sense of mass.  Nodes: None palpable.  Lungs: Clear bilaterally without rales  Heart: Regular rate and rhythm. No Murmur.  Abdomen: Soft. Nondistended. Patient feels "firm" in epigastric and RUQ  Extremities: No cyanosis or clubbing.  Skin: Warm. Dry. Good turgor. No rash.   Psychiatric: Appropriate affect and mood for situation.     Laboratory & Imaging Data--  CBC                        8.9    8.47  )-----------( 143      ( 20 Oct 2024 22:50 )             26.9       Chemistries  10-20    143  |  109[H]  |  36[H]  ----------------------------<  98  3.3[L]   |  26  |  1.84[H]    Ca    8.6      20 Oct 2024 22:50    TPro  6.0  /  Alb  3.1[L]  /  TBili  0.7  /  DBili  x   /  AST  41[H]  /  ALT  20  /  AlkPhos  80  10-    Lipase today 51    ACC: [id number] EXAM:  CT ABDOMEN AND PELVIS IC   ORDERED BY: ROLY MONAHAN     PROCEDURE DATE:  10/21/2024      INTERPRETATION:  CLINICAL INFORMATION: Common bile duct dilatation    COMPARISON: CT abdomen/pelvis 10/20/2024, CT abdomen/pelvis 2016    CONTRAST/COMPLICATIONS:  IV Contrast: Omnipaque 350  90 cc administered   10 cc discarded  Oral Contrast: NONE  Complications: None reported at time of study completion    PROCEDURE:  CT of the Abdomen and Pelvis was performed.  Sagittal and coronal reformats were performed.    FINDINGS:  LOWER CHEST: Cardiomegaly. Aortic valve and coronary artery   calcifications. Bibasilar subsegmental atelectasis.    LIVER: Hepatic cysts and subcentimeter low-density lesions.  BILE DUCTS: Mild intrahepatic and extra hepatic biliary ductal dilatation   with common bile duct measuring up to 1.3 cm and tapering distally   towards the ampulla without obstructing stone identified.  GALLBLADDER: Cholelithiasis.  SPLEEN: Within normal limits.  PANCREAS: Diffusely dilated pancreatic duct measuring approximately 5 mm.   3 mm stone versus parenchymal calcification in the pancreatic   head/uncinate process. 1.1 cm irregular low-density lesion in the   pancreatic tail with mild surrounding inflammatory changes.  ADRENALS: Within normal limits.  KIDNEYS/URETERS: Atrophic left kidney.    BLADDER: Excreted contrast.  REPRODUCTIVE ORGANS: Uterus and adnexa within normal limits.    BOWEL: Small hiatal hernia. No bowel obstruction. Colonic diverticulosis.   Appendix is normal.  PERITONEUM/RETROPERITONEUM: Within normal limits.  VESSELS: Atherosclerotic changes. Redemonstrated abnormal course of the   splenic vein.  LYMPH NODES: No lymphadenopathy.  ABDOMINAL WALL: Within normal limits.  BONES: Degenerative changes. Scoliotic curvature. Decreased bone mineral   density. Retrolisthesis T12 on L1 and L1 on L2.    IMPRESSION:  Mild intrahepatic and extra hepatic biliary ductal dilatation with common   bile duct tapering distally towards the ampulla and mild diffuse   pancreatic ductal dilatation without obstructing radiopaque stone or mass   identified.    1.1 cm irregular low-density lesion in the pancreatic tail with mild   surrounding inflammatory changes. Recommend correlation with lipase   levels. Recommend further evaluation with outpatient MRI/MRCP to exclude   solid mass.    Impression:   1. Abdominal pain  2. Dilated CBD tapering towards ampulla  3. Possible stone in pancreatic head/uncinate  4. Dilated PD  5. 1.1 cm low density lesion in pancreatic tail with inflammatory changes  6. Fever - ddx pancreatitis (possibly resolved gallstone pancreatitis) vs cholangitis vs cholecystitis    Recommend:  - daily LFTs  - MRI with and without IV contrast with MRCP to further assess  - hold clopidogrel if feasible medically     Discussed with hospitalist via Teams

## 2024-10-21 NOTE — H&P ADULT - HISTORY OF PRESENT ILLNESS
84 year old female with a PMH of DM, HTN, Gout, CVA (R-hemiplegia), Hypothyroidism, GERD, CDK, stress incontinence presents to the ED for acute onset of SOB associated with chills & shivering started 30 minutes prior to ED arrival. Also endorses LUQ abdominal pain with “bloating” sensation. Patient became mildly hypoxic in RA saturating 92% & was placed on 2L-NC. In the ED patient met SIRS criteria with Fever T-max: 101.4, Tachycardia- HR:126, Tachypneic- HR:23. Received 1L-NS, Ceftriaxone, Azithromycin, Zosyn and Tylenol.   CT-ABD: Mild intrahepatic and extra hepatic biliary ductal dilatation with common bile duct tapering distally towards the ampulla and mild diffuse pancreatic ductal dilatation without obstructing radiopaque stone or mass identified. 1.1 cm irregular low-density lesion in the pancreatic tail with mild surrounding inflammatory changes   84 year old [place] speaking  female with a PMH of DM, HTN, Gout, CVA (R-hemiplegia), Hypothyroidism, GERD, CDK, stress incontinence presents to the ED for acute onset of SOB associated with chills & shivering started 30 minutes prior to ED arrival. Also endorses LUQ abdominal pain with “bloating” sensation. Patient became mildly hypoxic in RA saturating 92% & was placed on 2L-NC. In the ED patient met SIRS criteria with Fever T-max: 101.4, Tachycardia- HR:126, Tachypneic- HR:23. Received 1L-NS, Ceftriaxone, Azithromycin, Zosyn and Tylenol.   CT-ABD: Mild intrahepatic and extra hepatic biliary ductal dilatation with common bile duct tapering distally towards the ampulla and mild diffuse pancreatic ductal dilatation without obstructing radiopaque stone or mass identified. 1.1 cm irregular low-density lesion in the pancreatic tail with mild surrounding inflammatory changes

## 2024-10-21 NOTE — H&P ADULT - NSHPPHYSICALEXAM_GEN_ALL_CORE
GENERAL: NAD, comfortable in bed. Lethargic   HEAD:  Atraumatic, Normocephalic  EYES: EOMI, PERRL, conjunctiva and sclera clear  NECK: Supple, No JVD  LUNG: Clear to auscultation bilaterally; No wheezes, rales or rhonchi  HEART: Regular rate and rhythm; No murmurs, rubs, or gallops  ABDOMEN: Soft, LUQ tenderness,  Nondistended  EXTREMITIES:   No clubbing, cyanosis, or edema  PSYCH: normal mood and affect  SKIN: No rashes or lesions GENERAL: NAD, comfortable in bed. AAOx3  HEAD:  Atraumatic, Normocephalic  EYES: EOMI, PERRL, conjunctiva and sclera clear  NECK: Supple, No JVD  LUNG: Clear to auscultation bilaterally; No wheezes, rales or rhonchi  HEART: Regular rate and rhythm; No murmurs, rubs, or gallops  ABDOMEN: Soft, Mild LUQ tenderness,  Nondistended  EXTREMITIES:   No clubbing, cyanosis, or edema  PSYCH: normal mood and affect  SKIN: No rashes or lesions

## 2024-10-21 NOTE — H&P ADULT - PROBLEM SELECTOR PLAN 3
ISS with hypoglycemia protocol   - F/U A1C Normotensive   Continue home meds   - Amlodipine 5mg  - Carvedilol 12.5mg

## 2024-10-21 NOTE — CONSULT NOTE ADULT - REASON FOR ADMISSION
[facility]                                                                                                                    PRE OP INSTRUCTIONS FOR  Ples Hasten        Date: 12/12/2023    Date of surgery: 12/20/23   Arrival Time: Hospital will call you between 5pm and 7pm Tuesday with your final arrival time for surgery    Do not eat or drink anything after midnight prior to surgery. This includes no water, chewing gum, mints or ice chips. Take the following medications with a small sip of water on the morning of Surgery: Omeprazole, Paxil, Gabapentin     Diabetics may take evening dose of insulin but none after midnight. If you feel symptomatic or low blood sugar morning of surgery drink 1-2 ounces of apple juice only. Aspirin, Ibuprofen, Advil, Naproxen, Vitamin E and other Anti-inflammatory products should be stopped  before surgery  as directed by your physician. Take Tylenol only unless instructed otherwise by your surgeon. Check with your Doctor regarding stopping Plavix, Coumadin, Lovenox, Eliquis, Effient, or other blood thinners. Do not smoke,use illicit drugs and do not drink any alcoholic beverages 24 hours prior to surgery. You may brush your teeth the morning of surgery. DO NOT SWALLOW WATER    You MUST make arrangements for a responsible adult to take you home after your surgery. You will not be allowed to leave alone or drive yourself home. It is strongly suggested someone stay with you the first 24 hrs. Your surgery will be cancelled if you do not have a ride home. PEDIATRIC PATIENTS ONLY:  A parent/legal guardian must accompany a child scheduled for surgery and plan to stay at the hospital until the child is discharged. Please do not bring other children with you.     Please wear simple, loose fitting clothing to the hospital.  Eulas Chol not bring valuables (money, credit cards, checkbooks, etc.) Do not wear any makeup (including no eye makeup) or nail
Patient attended preoperative [facility] on 12/12/2023. Patient is scheduled to have an elective knee replacement. Patient was educated regarding Disease Process, Medications, Smoking Cessation, Oxygenation, Incentive Spirometry and Deep Breath and Cough, signs and symptoms of postoperative joint infection that include: Fever, Chills, Pain Control, Drainage and Redness, post-op follow up with orthopaedic surgeon, dressing removal, staple removal, ambulatory devices which include a wheeled walker and cane, bed mobility, correct anatomical alignment, active range of motion, proper transferring technique, incision care, infection prevention measures, non-pharmacologic comfort measures, notification of inadequate pain control measures, pain scale for assessing level of pain, pharmacologic pain management, relaxation techniques.
SIRS with CBD Dilation
SIRS with CBD Dilation

## 2024-10-21 NOTE — ED PROVIDER NOTE - PHYSICAL EXAMINATION
General: appears lethargic  HEENT: NCAT, Neck supple without meningismus, PERRL, no conjunctival injection  Lungs: CTAB, No wheeze or crackles, No retractions, No increased work of breathing  Heart: S1S2 RRR, No M/R/G, Pules equal Bilaterally in upper and lower extremities distally  Abd: soft, LUQ mild TTP,, No guarding, No rebound.  No hernias, no palpable masses.  Extrem: FROM in all joints, no gross deformities appreciated, no significant edema noted, No ulcers. Cap refil < 2sec.  Skin: No rash noted, warm dry.  Neuro:  Grossly normal.

## 2024-10-21 NOTE — H&P ADULT - PROBLEM SELECTOR PLAN 1
CT-ABD: Mild intrahepatic and extra hepatic biliary ductal dilatation with common bile duct tapering distally towards the ampulla and mild diffuse pancreatic ductal dilatation without obstructing radiopaque stone or mass identified. 1.1 cm irregular low-density lesion in the pancreatic tail with mild surrounding inflammatory change  In the ED patient met SIRS criteria with Fever T-max: 101.4, Tachycardia- HR:126, Tachypneic- HR:23. Received 1L-NS, Ceftriaxone, Azithromycin, Zosyn and Tylenol.   - NPO  - Pain Management   - Tylenol for fever   - MRCP   - GI Consult   - DVT Prophylaxis In the ED patient met SIRS criteria with Fever T-max: 101.4, Tachycardia- HR:126, Tachypneic- HR:23.   Received 1L-NS, Ceftriaxone, Azithromycin, Zosyn and Tylenol.  - IV-NS   - Zosyn   - Tylenol for fever   - F/U Culture

## 2024-10-21 NOTE — H&P ADULT - NSHPLABSRESULTS_GEN_ALL_CORE
8.9    8.47  )-----------( 143      ( 20 Oct 2024 22:50 )             26.9     143  |  109[H]  |  36[H]  ----------------------------<  98     10-20  3.3[L]   |  26  |  1.84[H]    Ca    8.6      20 Oct 2024 22:50    TPro  6.0  /  Alb  3.1[L]  /  TBili  0.7  /  DBili  x   /  AST  41[H]  /  ALT  20  /  AlkPhos  80  10-    PT/INR: 12.8/1.13 (10-20-24 @ 22:50)  PTT: 36.8 (10-20-24 @ 22:50)      Urinalysis Basic - ( 21 Oct 2024 03:30 )  Color: Yellow / Appearance: Clear / S.014 / pH: 6.0  Gluc: Negative mg/dL / Ketone: Negative mg/dL  / Bili: Negative / Urobili: 0.2 mg/dL   Blood: Negative / Protein: Negative mg/dL / Nitrite: Positive   Leuk Esterase: Trace / RBC: 0 /HPF / WBC 1 /HPF   Sq Epi: x / Bacteria: Negative /HPF  Hyaline Casts: x/WBC Casts: x        Urinalysis with Rflx Culture (collected 21 Oct 2024 03:30)      CT-ABD  LOWER CHEST: Cardiomegaly. Aortic valve and coronary artery calcifications. Bibasilar subsegmental atelectasis.    LIVER: Hepatic cysts and subcentimeter low-density lesions.  BILE DUCTS: Mild intrahepatic and extra hepatic biliary ductal dilatation with common bile duct measuring up to 1.3 cm and tapering distally towards the ampulla without obstructing stone identified.  GALLBLADDER: Cholelithiasis.  SPLEEN: Within normal limits.  PANCREAS: Diffusely dilated pancreatic duct measuring approximately 5 mm. 3 mm stone versus parenchymal calcification in the pancreatic head/uncinate process. 1.1 cm irregular low-density lesion in the pancreatic tail with mild surrounding inflammatory changes.  ADRENALS: Within normal limits.  KIDNEYS/URETERS: Atrophic left kidney.    BLADDER: Excreted contrast.  REPRODUCTIVE ORGANS: Uterus and adnexa within normal limits.    BOWEL: Small hiatal hernia. No bowel obstruction. Colonic diverticulosis. Appendix is normal.  PERITONEUM/RETROPERITONEUM: Within normal limits.  VESSELS: Atherosclerotic changes. Redemonstrated abnormal course of the splenic vein.  LYMPH NODES: No lymphadenopathy.  ABDOMINAL WALL: Within normal limits.  BONES: Degenerative changes. Scoliotic curvature. Decreased bone mineral density. Retrolisthesis T12 on L1 and L1 on L2.    IMPRESSION:  Mild intrahepatic and extra hepatic biliary ductal dilatation with common bile duct tapering distally towards the ampulla and mild diffuse pancreatic ductal dilatation without obstructing radiopaque stone or mass identified.        CTA-chest  LUNGS AND LARGE AIRWAYS: Patent central airways. Bilateral subsegmental atelectasis.  PLEURA: No pleural effusion.  VESSELS: Aortic calcifications. Coronary artery calcifications. Evaluation is limited to the segmental level in the lower lobes due to motion artifact. Within this limitation, no pulmonary embolism is identified.  HEART: Cardiomegaly. No pericardial effusion. Aortic valve calcifications.  MEDIASTINUM AND RODRIGO: No lymphadenopathy.  CHEST WALL AND LOWER NECK: Subcentimeter left thyroid nodule.  VISUALIZED UPPER ABDOMEN: Cholelithiasis. Dilated common bile duct measuring 1.3 cm. Small hiatal hernia.  BONES: Degenerative changes. Retrolisthesis T12 on L1.    IMPRESSION:  Evaluation is limited to the segmental level in the lower lobes due to motion artifact. Within this limitation, no pulmonary embolism is identified.    Cholelithiasis and partially visualized dilated common bile duct. Recommend clinical correlation, consider further evaluation with ultrasound as clinically indicated.

## 2024-10-21 NOTE — CONSULT NOTE ADULT - ASSESSMENT
SIRS with hypoxemia  Abdominal pain - biliary duct dilatation  Elevated troponins, no chest pain or active ECG changes, likely type II MI.      The chart has been reviewed but the patient has not yet been examined.  Full note to follow. SIRS with hypoxemia  Abdominal pain - biliary duct dilatation  Elevated troponins, no chest pain or active ECG changes, likely type II MI.    Plan:  TTE to assess LVEF.  Currently no evidence of active ischemia, continue to follow trops and will get serial ECGs.  No indication for risk stratification while she is septic and no chest pain.  Likely type 2 MI, in which case would not need to be on antiplatelet therapy from a cardiac standpoint.  Patient on very high dose of Lasix, unclear why, will review TTE.

## 2024-10-21 NOTE — PATIENT PROFILE ADULT - FALL HARM RISK - HARM RISK INTERVENTIONS
Assistance with ambulation/Assistance OOB with selected safe patient handling equipment/Communicate Risk of Fall with Harm to all staff/Discuss with provider need for PT consult/Monitor gait and stability/Reinforce activity limits and safety measures with patient and family/Tailored Fall Risk Interventions/Visual Cue: Yellow wristband and red socks/Bed in lowest position, wheels locked, appropriate side rails in place/Call bell, personal items and telephone in reach/Instruct patient to call for assistance before getting out of bed or chair/Non-slip footwear when patient is out of bed/[place] to call system/Physically safe environment - no spills, clutter or unnecessary equipment/Purposeful Proactive Rounding/Room/bathroom lighting operational, light cord in reach

## 2024-10-21 NOTE — PATIENT PROFILE ADULT - FUNCTIONAL ASSESSMENT - BASIC MOBILITY 1.
"2 = A lot of assistance

## 2024-10-22 ENCOUNTER — RESULT REVIEW (OUTPATIENT)
Age: 84
End: 2024-10-22

## 2024-10-22 LAB
A1C WITH ESTIMATED AVERAGE GLUCOSE RESULT: 5.5 % — SIGNIFICANT CHANGE UP (ref 4–5.6)
A1C WITH ESTIMATED AVERAGE GLUCOSE RESULT: 5.5 % — SIGNIFICANT CHANGE UP (ref 4–5.6)
ALBUMIN SERPL ELPH-MCNC: 2.6 G/DL — LOW (ref 3.3–5)
ALP SERPL-CCNC: 40 U/L — SIGNIFICANT CHANGE UP (ref 40–120)
ALT FLD-CCNC: 21 U/L — SIGNIFICANT CHANGE UP (ref 12–78)
ANION GAP SERPL CALC-SCNC: 8 MMOL/L — SIGNIFICANT CHANGE UP (ref 5–17)
AST SERPL-CCNC: 46 U/L — HIGH (ref 15–37)
BILIRUB SERPL-MCNC: 0.6 MG/DL — SIGNIFICANT CHANGE UP (ref 0.2–1.2)
BUN SERPL-MCNC: 35 MG/DL — HIGH (ref 7–23)
CALCIUM SERPL-MCNC: 8.8 MG/DL — SIGNIFICANT CHANGE UP (ref 8.5–10.1)
CHLORIDE SERPL-SCNC: 113 MMOL/L — HIGH (ref 96–108)
CHOLEST SERPL-MCNC: 102 MG/DL — SIGNIFICANT CHANGE UP
CO2 SERPL-SCNC: 21 MMOL/L — LOW (ref 22–31)
CREAT SERPL-MCNC: 2.2 MG/DL — HIGH (ref 0.5–1.3)
EGFR: 21 ML/MIN/1.73M2 — LOW
ESTIMATED AVERAGE GLUCOSE: 111 MG/DL — SIGNIFICANT CHANGE UP (ref 68–114)
ESTIMATED AVERAGE GLUCOSE: 111 MG/DL — SIGNIFICANT CHANGE UP (ref 68–114)
GLUCOSE BLDC GLUCOMTR-MCNC: 112 MG/DL — HIGH (ref 70–99)
GLUCOSE BLDC GLUCOMTR-MCNC: 71 MG/DL — SIGNIFICANT CHANGE UP (ref 70–99)
GLUCOSE BLDC GLUCOMTR-MCNC: 74 MG/DL — SIGNIFICANT CHANGE UP (ref 70–99)
GLUCOSE BLDC GLUCOMTR-MCNC: 81 MG/DL — SIGNIFICANT CHANGE UP (ref 70–99)
GLUCOSE BLDC GLUCOMTR-MCNC: 86 MG/DL — SIGNIFICANT CHANGE UP (ref 70–99)
GLUCOSE SERPL-MCNC: 67 MG/DL — LOW (ref 70–99)
HCT VFR BLD CALC: 26.9 % — LOW (ref 34.5–45)
HDLC SERPL-MCNC: 37 MG/DL — LOW
HGB BLD-MCNC: 8.5 G/DL — LOW (ref 11.5–15.5)
LIPID PNL WITH DIRECT LDL SERPL: 47 MG/DL — SIGNIFICANT CHANGE UP
MAGNESIUM SERPL-MCNC: 1.8 MG/DL — SIGNIFICANT CHANGE UP (ref 1.6–2.6)
MCHC RBC-ENTMCNC: 30.6 PG — SIGNIFICANT CHANGE UP (ref 27–34)
MCHC RBC-ENTMCNC: 31.6 G/DL — LOW (ref 32–36)
MCV RBC AUTO: 96.8 FL — SIGNIFICANT CHANGE UP (ref 80–100)
NON HDL CHOLESTEROL: 66 MG/DL — SIGNIFICANT CHANGE UP
NRBC # BLD: 0 /100 WBCS — SIGNIFICANT CHANGE UP (ref 0–0)
PLATELET # BLD AUTO: 115 K/UL — LOW (ref 150–400)
POTASSIUM SERPL-MCNC: 4.4 MMOL/L — SIGNIFICANT CHANGE UP (ref 3.5–5.3)
POTASSIUM SERPL-SCNC: 4.4 MMOL/L — SIGNIFICANT CHANGE UP (ref 3.5–5.3)
PROT SERPL-MCNC: 5.6 GM/DL — LOW (ref 6–8.3)
RBC # BLD: 2.78 M/UL — LOW (ref 3.8–5.2)
RBC # FLD: 17.4 % — HIGH (ref 10.3–14.5)
SODIUM SERPL-SCNC: 142 MMOL/L — SIGNIFICANT CHANGE UP (ref 135–145)
TRIGL SERPL-MCNC: 100 MG/DL — SIGNIFICANT CHANGE UP
TROPONIN I, HIGH SENSITIVITY RESULT: 655.3 NG/L — HIGH
WBC # BLD: 5.89 K/UL — SIGNIFICANT CHANGE UP (ref 3.8–10.5)
WBC # FLD AUTO: 5.89 K/UL — SIGNIFICANT CHANGE UP (ref 3.8–10.5)

## 2024-10-22 PROCEDURE — 99233 SBSQ HOSP IP/OBS HIGH 50: CPT | Mod: 25

## 2024-10-22 PROCEDURE — 93306 TTE W/DOPPLER COMPLETE: CPT | Mod: 26

## 2024-10-22 PROCEDURE — 74183 MRI ABD W/O CNTR FLWD CNTR: CPT | Mod: 26

## 2024-10-22 PROCEDURE — 99232 SBSQ HOSP IP/OBS MODERATE 35: CPT

## 2024-10-22 RX ADMIN — HEPARIN SODIUM 5000 UNIT(S): 10000 INJECTION INTRAVENOUS; SUBCUTANEOUS at 05:33

## 2024-10-22 RX ADMIN — Medication 75 MILLILITER(S): at 13:50

## 2024-10-22 RX ADMIN — NYSTATIN 1 APPLICATION(S): 100000 POWDER TOPICAL at 05:33

## 2024-10-22 RX ADMIN — PIPERACILLIN AND TAZOBACTAM 25 GRAM(S): .5; 4 INJECTION, POWDER, LYOPHILIZED, FOR SOLUTION INTRAVENOUS at 21:08

## 2024-10-22 RX ADMIN — PIPERACILLIN AND TAZOBACTAM 25 GRAM(S): .5; 4 INJECTION, POWDER, LYOPHILIZED, FOR SOLUTION INTRAVENOUS at 13:50

## 2024-10-22 RX ADMIN — CARVEDILOL 12.5 MILLIGRAM(S): 25 TABLET, FILM COATED ORAL at 05:34

## 2024-10-22 RX ADMIN — Medication 75 MILLILITER(S): at 21:08

## 2024-10-22 RX ADMIN — MONTELUKAST SODIUM 10 MILLIGRAM(S): 10 TABLET, FILM COATED ORAL at 12:08

## 2024-10-22 RX ADMIN — CLOPIDOGREL 75 MILLIGRAM(S): 75 TABLET ORAL at 12:09

## 2024-10-22 RX ADMIN — Medication 5 MILLIGRAM(S): at 05:34

## 2024-10-22 RX ADMIN — CARVEDILOL 12.5 MILLIGRAM(S): 25 TABLET, FILM COATED ORAL at 17:18

## 2024-10-22 RX ADMIN — Medication 40 MILLIGRAM(S): at 13:50

## 2024-10-22 RX ADMIN — NYSTATIN 1 APPLICATION(S): 100000 POWDER TOPICAL at 17:11

## 2024-10-22 RX ADMIN — PIPERACILLIN AND TAZOBACTAM 25 GRAM(S): .5; 4 INJECTION, POWDER, LYOPHILIZED, FOR SOLUTION INTRAVENOUS at 05:33

## 2024-10-22 RX ADMIN — Medication 40 MILLIGRAM(S): at 05:34

## 2024-10-22 RX ADMIN — Medication 25 MICROGRAM(S): at 05:34

## 2024-10-22 RX ADMIN — PANTOPRAZOLE SODIUM 40 MILLIGRAM(S): 40 TABLET, DELAYED RELEASE ORAL at 05:38

## 2024-10-22 RX ADMIN — MEMANTINE HYDROCHLORIDE 10 MILLIGRAM(S): 21 CAPSULE, EXTENDED RELEASE ORAL at 12:09

## 2024-10-22 RX ADMIN — HEPARIN SODIUM 5000 UNIT(S): 10000 INJECTION INTRAVENOUS; SUBCUTANEOUS at 17:11

## 2024-10-22 NOTE — PROGRESS NOTE ADULT - ASSESSMENT
A/P    84 year old Malay speaking  female with a PMH of DM, HTN, Gout, CVA (R-hemiplegia), Hypothyroidism, GERD, CDK, stress incontinence presents to the ED for acute onset of SOB associated with chills & shivering started 30 minutes prior to ED arrival. Also endorses LUQ abdominal pain with “bloating” sensation. Patient became mildly hypoxic in RA saturating 92% & was placed on 2L-NC. In the ED patient met SIRS criteria with Fever T-max: 101.4, Tachycardia- HR:126, Tachypneic- HR:23. Received 1L-NS, Ceftriaxone, Azithromycin, Zosyn and Tylenol.          Problem/Plan - 1:  ·  Problem: Systemic inflammatory response syndrome (SIRS).  ·  Plan: In the ED patient met SIRS criteria with Fever T-max: 101.4, Tachycardia- HR:126, Tachypneic- HR:23.   Received 1L-NS, Ceftriaxone, Azithromycin, Zosyn and Tylenol.  - IV-NS   - Zosyn   - Tylenol for fever   - F/U Culture results.     Problem/Plan - 2:  ·  Problem: Abdominal pain.  ·  Plan: CT-ABD: Mild intrahepatic and extra hepatic biliary ductal dilatation with common bile duct tapering distally towards the ampulla and mild diffuse pancreatic ductal dilatation without obstructing radiopaque stone or mass identified. 1.1 cm irregular low-density lesion in the pancreatic tail with mild surrounding inflammatory change  - NPO  - Pain Management   - MRCP today  - GI following  - DVT Prophylaxis.     Problem/Plan - 3:  ·  Problem: Hypertension.  ·  Plan: Normotensive   Continue home meds   - Amlodipine 5mg  - Carvedilol 12.5mg.     Problem/Plan - 4:  ·  Problem: Diabetes mellitus.   ·  Plan: ISS with hypoglycemia protocol   - F/U A1C.     Problem/Plan - 5:  . NSTEMI:  - Hemodynamically stable, TTE pending, cardiology following    Kiara Waters MD

## 2024-10-22 NOTE — PROGRESS NOTE ADULT - SUBJECTIVE AND OBJECTIVE BOX
Patient is a 84y old  Female who presents with a chief complaint of acute onset of SOB    PAST MEDICAL & SURGICAL HISTORY:  Hypertension    Chronic kidney disease    Type 2 diabetes mellitus    Gout    CVA (R-hemiplegia)    Hypothyroidism    Former smoker    Dyspepsia    S/P  section    H/O tubal ligation    INTERVAL HISTORY:  	  MEDICATIONS:  MEDICATIONS  (STANDING):  allopurinol 50 milliGRAM(s) Oral <User Schedule>  amLODIPine   Tablet 5 milliGRAM(s) Oral daily  carvedilol 12.5 milliGRAM(s) Oral every 12 hours  clopidogrel Tablet 75 milliGRAM(s) Oral daily  furosemide    Tablet 40 milliGRAM(s) Oral two times a day  heparin   Injectable 5000 Unit(s) SubCutaneous every 12 hours  insulin lispro (ADMELOG) corrective regimen sliding scale   SubCutaneous every 6 hours  levothyroxine 25 MICROGram(s) Oral daily  memantine 10 milliGRAM(s) Oral daily  montelukast 10 milliGRAM(s) Oral daily  nystatin Powder 1 Application(s) Topical two times a day  pantoprazole    Tablet 40 milliGRAM(s) Oral before breakfast  piperacillin/tazobactam IVPB.. 3.375 Gram(s) IV Intermittent every 8 hours  sodium chloride 0.9%. 1000 milliLiter(s) (60 mL/Hr) IV Continuous <Continuous>    MEDICATIONS  (PRN):  acetaminophen     Tablet .. 650 milliGRAM(s) Oral every 6 hours PRN Temp greater or equal to 38C (100.4F), Mild Pain (1 - 3)  aluminum hydroxide/magnesium hydroxide/simethicone Suspension 30 milliLiter(s) Oral every 4 hours PRN Dyspepsia  dextrose Oral Gel 15 Gram(s) Oral once PRN Blood Glucose LESS THAN 70 milliGRAM(s)/deciliter  melatonin 3 milliGRAM(s) Oral at bedtime PRN Insomnia  ondansetron Injectable 4 milliGRAM(s) IV Push every 8 hours PRN Nausea and/or Vomiting    Vitals:  T(F): 97.8 (10-22-24 @ 04:49), Max: 97.8 (10-22-24 @ 04:49)  HR: 74 (10-22-24 @ 10:26) (61 - 74)  BP: 125/68 (10-22-24 @ 04:49) (125/68 - 136/74)  RR: 18 (10-22-24 @ 04:49) (18 - 18)  SpO2: 97% (10-22-24 @ 04:49) (97% - 99%)    10-21 @ 07:01  -  10-22 @ 07:00  --------------------------------------------------------  IN:  Total IN: 0 mL    OUT:    Voided (mL): 500 mL  Total OUT: 500 mL    Total NET: -500 mL    Weight (kg): 72 (10-21 @ 09:54)  BMI (kg/m2): 31 (10-21 @ 09:54)    PHYSICAL EXAM:  Neuro: Awake, responsive  CV: S1 S2 RRR  Lungs: CTABL  GI: Soft, BS +, ND, NT  Extremities: No edema    TELEMETRY: sinus    RADIOLOGY:   < from: CT Abdomen and Pelvis w/ IV Cont (10.21.24 @ 04:43) >  LOWER CHEST: Cardiomegaly. Aortic valve and coronary artery   calcifications. Bibasilar subsegmental atelectasis.    LIVER: Hepatic cysts and subcentimeter low-density lesions.  BILE DUCTS: Mild intrahepatic and extra hepatic biliary ductal dilatation   with common bile duct measuring up to 1.3 cm and tapering distally   towards the ampulla without obstructing stone identified.  GALLBLADDER: Cholelithiasis.  SPLEEN: Within normal limits.  PANCREAS: Diffusely dilated pancreatic duct measuring approximately 5 mm.   3 mm stone versus parenchymal calcification in the pancreatic   head/uncinate process. 1.1 cm irregular low-density lesion in the   pancreatic tail with mild surrounding inflammatory changes.  ADRENALS: Within normal limits.  KIDNEYS/URETERS: Atrophic left kidney.    BLADDER: Excreted contrast.  REPRODUCTIVE ORGANS: Uterus and adnexa within normal limits.    BOWEL: Small hiatal hernia. No bowel obstruction. Colonic diverticulosis.   Appendix is normal.  PERITONEUM/RETROPERITONEUM: Within normal limits.  VESSELS: Atherosclerotic changes. Redemonstrated abnormal course of the   splenic vein.  LYMPH NODES: No lymphadenopathy.  ABDOMINAL WALL: Within normal limits.  BONES: Degenerative changes. Scoliotic curvature. Decreased bone mineral   density. Retrolisthesis T12 on L1 and L1 on L2.    IMPRESSION:  Mild intrahepatic and extra hepatic biliary ductal dilatation with common   bile duct tapering distally towards the ampulla and milddiffuse   pancreatic ductal dilatation without obstructing radiopaque stone or mass   identified.    1.1 cm irregular low-density lesion in the pancreatic tail with mild   surrounding inflammatory changes. Recommend correlation with lipase   levels. Recommend further evaluation with outpatient MRI/MRCP to exclude   solid mass.    < end of copied text >    DIAGNOSTIC TESTING:    [x ] Echocardiogram:   < from: Transthoracic Echocardiogram (16 @ 16:42) >  1. Normal left ventricular internal dimensions and wall  thicknesses.  2. Hyperdynamic left ventricle.    < end of copied text >    LABS:	 	    CARDIAC MARKERS:  Troponin I, High Sensitivity Result: 1051.1 ng/L (10-21 @ 10:48)  Troponin I, High Sensitivity Result: 1114.3 ng/L (10-21 @ 06:05)  Troponin I, High Sensitivity Result: 159.6 ng/L (10-20 @ 22:50)    22 Oct 2024 06:52    142    |  113    |  35     ----------------------------<  67     4.4     |  21     |  2.20   21 Oct 2024 14:26    141    |  110    |  36     ----------------------------<  88     3.2     |  26     |  2.04   20 Oct 2024 22:50    143    |  109    |  36     ----------------------------<  98     3.3     |  26     |  1.84     Ca    8.8        22 Oct 2024 06:52  Mg     1.8       22 Oct 2024 06:52    TPro  5.6    /  Alb  2.6    /  TBili  0.6    /  DBili  x      /  AST  46     /  ALT  21     /  AlkPhos  40     22 Oct 2024 06:52                        8.5    5.89  )-----------( 115      ( 22 Oct 2024 06:52 )             26.9 ,                       8.8    10.82 )-----------( 127      ( 21 Oct 2024 14:26 )             27.1 ,                       8.9    8.47  )-----------( 143      ( 20 Oct 2024 22:50 )             26.9   Lipid Profile: 10-21 @ 14:26  HDL Chol:              37 mg/dL  Serum Chol:            102 mg/dL  Triglycerides:         100 mg/dL    INR: 1.13 ratio (10-20 @ 22:50)           Patient is a 84y old  Female who presents with a chief complaint of acute onset of SOB    PAST MEDICAL & SURGICAL HISTORY:  Hypertension    Chronic kidney disease    anemia    PVD    Type 2 diabetes mellitus    Gout    CVA (R-hemiplegia)    Hypothyroidism    Former smoker    Dyspepsia    S/P  section    H/O tubal ligation    INTERVAL HISTORY: in no acute distress, with upper abd discomfort and tenderness, +dyspnea associated with abd discomfort, no chest pain   	  MEDICATIONS:  MEDICATIONS  (STANDING):  allopurinol 50 milliGRAM(s) Oral <User Schedule>  amLODIPine   Tablet 5 milliGRAM(s) Oral daily  carvedilol 12.5 milliGRAM(s) Oral every 12 hours  clopidogrel Tablet 75 milliGRAM(s) Oral daily  furosemide    Tablet 40 milliGRAM(s) Oral two times a day  heparin   Injectable 5000 Unit(s) SubCutaneous every 12 hours  insulin lispro (ADMELOG) corrective regimen sliding scale   SubCutaneous every 6 hours  levothyroxine 25 MICROGram(s) Oral daily  memantine 10 milliGRAM(s) Oral daily  montelukast 10 milliGRAM(s) Oral daily  nystatin Powder 1 Application(s) Topical two times a day  pantoprazole    Tablet 40 milliGRAM(s) Oral before breakfast  piperacillin/tazobactam IVPB.. 3.375 Gram(s) IV Intermittent every 8 hours  sodium chloride 0.9%. 1000 milliLiter(s) (60 mL/Hr) IV Continuous <Continuous>    MEDICATIONS  (PRN):  acetaminophen     Tablet .. 650 milliGRAM(s) Oral every 6 hours PRN Temp greater or equal to 38C (100.4F), Mild Pain (1 - 3)  aluminum hydroxide/magnesium hydroxide/simethicone Suspension 30 milliLiter(s) Oral every 4 hours PRN Dyspepsia  dextrose Oral Gel 15 Gram(s) Oral once PRN Blood Glucose LESS THAN 70 milliGRAM(s)/deciliter  melatonin 3 milliGRAM(s) Oral at bedtime PRN Insomnia  ondansetron Injectable 4 milliGRAM(s) IV Push every 8 hours PRN Nausea and/or Vomiting    Vitals:  T(F): 97.8 (10-22-24 @ 04:49), Max: 97.8 (10-22-24 @ 04:49)  HR: 74 (10-22-24 @ 10:26) (61 - 74)  BP: 125/68 (10-22-24 @ 04:49) (125/68 - 136/74)  RR: 18 (10-22-24 @ 04:49) (18 - 18)  SpO2: 97% (10-22-24 @ 04:49) (97% - 99%)    10-21 @ 07:01  -  10-22 @ 07:00  --------------------------------------------------------  IN:  Total IN: 0 mL    OUT:    Voided (mL): 500 mL  Total OUT: 500 mL    Total NET: -500 mL    Weight (kg): 72 (10-21 @ 09:54)  BMI (kg/m2): 31 (10-21 @ 09:54)    PHYSICAL EXAM:  Neuro: Awake, responsive  CV: S1 S2 RRR  Lungs: diminished to bases  GI: Soft, BS +, ND, mild upper abd tenderness   Extremities: No edema    TELEMETRY: sinus    RADIOLOGY:   < from: CT Abdomen and Pelvis w/ IV Cont (10.21.24 @ 04:43) >  LOWER CHEST: Cardiomegaly. Aortic valve and coronary artery   calcifications. Bibasilar subsegmental atelectasis.    LIVER: Hepatic cysts and subcentimeter low-density lesions.  BILE DUCTS: Mild intrahepatic and extra hepatic biliary ductal dilatation   with common bile duct measuring up to 1.3 cm and tapering distally   towards the ampulla without obstructing stone identified.  GALLBLADDER: Cholelithiasis.  SPLEEN: Within normal limits.  PANCREAS: Diffusely dilated pancreatic duct measuring approximately 5 mm.   3 mm stone versus parenchymal calcification in the pancreatic   head/uncinate process. 1.1 cm irregular low-density lesion in the   pancreatic tail with mild surrounding inflammatory changes.  ADRENALS: Within normal limits.  KIDNEYS/URETERS: Atrophic left kidney.    BLADDER: Excreted contrast.  REPRODUCTIVE ORGANS: Uterus and adnexa within normal limits.    BOWEL: Small hiatal hernia. No bowel obstruction. Colonic diverticulosis.   Appendix is normal.  PERITONEUM/RETROPERITONEUM: Within normal limits.  VESSELS: Atherosclerotic changes. Redemonstrated abnormal course of the   splenic vein.  LYMPH NODES: No lymphadenopathy.  ABDOMINAL WALL: Within normal limits.  BONES: Degenerative changes. Scoliotic curvature. Decreased bone mineral   density. Retrolisthesis T12 on L1 and L1 on L2.    IMPRESSION:  Mild intrahepatic and extra hepatic biliary ductal dilatation with common   bile duct tapering distally towards the ampulla and milddiffuse   pancreatic ductal dilatation without obstructing radiopaque stone or mass   identified.    1.1 cm irregular low-density lesion in the pancreatic tail with mild   surrounding inflammatory changes. Recommend correlation with lipase   levels. Recommend further evaluation with outpatient MRI/MRCP to exclude   solid mass.    < end of copied text >    DIAGNOSTIC TESTING:    [x ] Echocardiogram:   < from: Transthoracic Echocardiogram (16 @ 16:42) >  1. Normal left ventricular internal dimensions and wall  thicknesses.  2. Hyperdynamic left ventricle.    < end of copied text >    LABS:	 	    CARDIAC MARKERS:  Troponin I, High Sensitivity Result: 1051.1 ng/L (10-21 @ 10:48)  Troponin I, High Sensitivity Result: 1114.3 ng/L (10-21 @ 06:05)  Troponin I, High Sensitivity Result: 159.6 ng/L (10-20 @ 22:50)    22 Oct 2024 06:52    142    |  113    |  35     ----------------------------<  67     4.4     |  21     |  2.20   21 Oct 2024 14:26    141    |  110    |  36     ----------------------------<  88     3.2     |  26     |  2.04   20 Oct 2024 22:50    143    |  109    |  36     ----------------------------<  98     3.3     |  26     |  1.84     Ca    8.8        22 Oct 2024 06:52  Mg     1.8       22 Oct 2024 06:52    TPro  5.6    /  Alb  2.6    /  TBili  0.6    /  DBili  x      /  AST  46     /  ALT  21     /  AlkPhos  40     22 Oct 2024 06:52                        8.5    5.89  )-----------( 115      ( 22 Oct 2024 06:52 )             26.9 ,                       8.8    10.82 )-----------( 127      ( 21 Oct 2024 14:26 )             27.1 ,                       8.9    8.47  )-----------( 143      ( 20 Oct 2024 22:50 )             26.9   Lipid Profile: 10-21 @ 14:26  HDL Chol:              37 mg/dL  Serum Chol:            102 mg/dL  Triglycerides:         100 mg/dL    INR: 1.13 ratio (10-20 @ 22:50)

## 2024-10-22 NOTE — PROGRESS NOTE ADULT - ASSESSMENT
84 year old Pashto speaking  female with a PMH of DM, HTN, Gout, CVA (R-hemiplegia), Hypothyroidism, GERD, CDK, anemia, stress incontinence presents to the ED for acute onset of SOB associated with chills & shivering started 30 minutes prior to ED arrival. Also endorses LUQ abdominal pain with “bloating” sensation. Patient became mildly hypoxic in RA saturating 92% & was placed on 2L-NC. In the ED patient met SIRS criteria with Fever T-max: 101.4  CT-ABD: Mild intrahepatic and extra hepatic biliary ductal dilatation with common bile duct tapering distally towards the ampulla and mild diffuse pancreatic ductal dilatation without obstructing radiopaque stone or mass identified. 1.1 cm irregular low-density lesion in the pancreatic tail with mild surrounding inflammatory changes.    SIRS with hypoxemia  Abdominal pain  Elevated troponins, no chest pain or active ECG changes, likely type II MI.    Plan:  TTE to assess LVEF.  Currently no evidence of active ischemia, trops downtrending   No indication for risk stratification while she is septic and no chest pain.  Likely type 2 MI, in which case would not need to be on antiplatelet therapy from a cardiac standpoint.  Patient on very high dose of Lasix, no overt fluid overload, pt also on IVF, creat worsening, will review TTE.

## 2024-10-22 NOTE — PROGRESS NOTE ADULT - SUBJECTIVE AND OBJECTIVE BOX
Patient is a 84y old  Female who presents with a chief complaint of SIRS with CBD Dilation (22 Oct 2024 10:28)      INTERVAL HPI/OVERNIGHT EVENTS: No acute events overnight, afebrile, abdominal pain improved.     MEDICATIONS  (STANDING):  allopurinol 50 milliGRAM(s) Oral <User Schedule>  amLODIPine   Tablet 5 milliGRAM(s) Oral daily  carvedilol 12.5 milliGRAM(s) Oral every 12 hours  clopidogrel Tablet 75 milliGRAM(s) Oral daily  dextrose 5% + sodium chloride 0.45%. 1000 milliLiter(s) (75 mL/Hr) IV Continuous <Continuous>  dextrose 5%. 1000 milliLiter(s) (50 mL/Hr) IV Continuous <Continuous>  dextrose 50% Injectable 25 Gram(s) IV Push once  furosemide    Tablet 40 milliGRAM(s) Oral two times a day  glucagon  Injectable 1 milliGRAM(s) IntraMuscular once  heparin   Injectable 5000 Unit(s) SubCutaneous every 12 hours  insulin lispro (ADMELOG) corrective regimen sliding scale   SubCutaneous every 6 hours  levothyroxine 25 MICROGram(s) Oral daily  memantine 10 milliGRAM(s) Oral daily  montelukast 10 milliGRAM(s) Oral daily  nystatin Powder 1 Application(s) Topical two times a day  pantoprazole    Tablet 40 milliGRAM(s) Oral before breakfast  piperacillin/tazobactam IVPB.. 3.375 Gram(s) IV Intermittent every 8 hours    MEDICATIONS  (PRN):  acetaminophen     Tablet .. 650 milliGRAM(s) Oral every 6 hours PRN Temp greater or equal to 38C (100.4F), Mild Pain (1 - 3)  aluminum hydroxide/magnesium hydroxide/simethicone Suspension 30 milliLiter(s) Oral every 4 hours PRN Dyspepsia  dextrose Oral Gel 15 Gram(s) Oral once PRN Blood Glucose LESS THAN 70 milliGRAM(s)/deciliter  melatonin 3 milliGRAM(s) Oral at bedtime PRN Insomnia  ondansetron Injectable 4 milliGRAM(s) IV Push every 8 hours PRN Nausea and/or Vomiting      Allergies    No Known Allergies    Intolerances        REVIEW OF SYSTEMS:  CONSTITUTIONAL: +ve for fatigue  EYES: No eye pain, visual disturbances, or discharge  ENMT:  No difficulty hearing, tinnitus, vertigo; No sinus or throat pain  NECK: No pain or stiffness      Vital Signs Last 24 Hrs  T(C): 36.6 (22 Oct 2024 04:49), Max: 36.6 (22 Oct 2024 04:49)  T(F): 97.8 (22 Oct 2024 04:49), Max: 97.8 (22 Oct 2024 04:49)  HR: 74 (22 Oct 2024 10:26) (61 - 74)  BP: 125/68 (22 Oct 2024 04:49) (125/68 - 136/74)  BP(mean): --  RR: 18 (22 Oct 2024 04:49) (18 - 18)  SpO2: 97% (22 Oct 2024 04:49) (97% - 99%)    Parameters below as of 21 Oct 2024 19:00  Patient On (Oxygen Delivery Method): nasal cannula  O2 Flow (L/min): 2      PHYSICAL EXAM:    HEAD:  Atraumatic, Normocephalic  EYES: EOMI, PERRLA, conjunctiva and sclera clear  ENMT: No tonsillar erythema, exudates, or enlargement; Moist mucous membranes, Good dentition, No lesions  NECK: Supple, No JVD, Normal thyroid      LABS:                        8.5    5.89  )-----------( 115      ( 22 Oct 2024 06:52 )             26.9     10-22    142  |  113[H]  |  35[H]  ----------------------------<  67[L]  4.4   |  21[L]  |  2.20[H]    Ca    8.8      22 Oct 2024 06:52  Mg     1.8     10-22    TPro  5.6[L]  /  Alb  2.6[L]  /  TBili  0.6  /  DBili  x   /  AST  46[H]  /  ALT  21  /  AlkPhos  40  10-22    PT/INR - ( 20 Oct 2024 22:50 )   PT: 12.8 sec;   INR: 1.13 ratio         PTT - ( 20 Oct 2024 22:50 )  PTT:36.8 sec  Urinalysis Basic - ( 22 Oct 2024 06:52 )    Color: x / Appearance: x / SG: x / pH: x  Gluc: 67 mg/dL / Ketone: x  / Bili: x / Urobili: x   Blood: x / Protein: x / Nitrite: x   Leuk Esterase: x / RBC: x / WBC x   Sq Epi: x / Non Sq Epi: x / Bacteria: x      CAPILLARY BLOOD GLUCOSE      POCT Blood Glucose.: 71 mg/dL (22 Oct 2024 08:03)  POCT Blood Glucose.: 74 mg/dL (22 Oct 2024 05:34)  POCT Blood Glucose.: 83 mg/dL (21 Oct 2024 23:02)  POCT Blood Glucose.: 104 mg/dL (21 Oct 2024 16:31)  POCT Blood Glucose.: 83 mg/dL (21 Oct 2024 12:25)

## 2024-10-23 LAB
ALBUMIN SERPL ELPH-MCNC: 2.6 G/DL — LOW (ref 3.3–5)
ALP SERPL-CCNC: 37 U/L — LOW (ref 40–120)
ALT FLD-CCNC: 19 U/L — SIGNIFICANT CHANGE UP (ref 12–78)
ANION GAP SERPL CALC-SCNC: 5 MMOL/L — SIGNIFICANT CHANGE UP (ref 5–17)
AST SERPL-CCNC: 43 U/L — HIGH (ref 15–37)
BILIRUB SERPL-MCNC: 0.6 MG/DL — SIGNIFICANT CHANGE UP (ref 0.2–1.2)
BUN SERPL-MCNC: 33 MG/DL — HIGH (ref 7–23)
CALCIUM SERPL-MCNC: 8.8 MG/DL — SIGNIFICANT CHANGE UP (ref 8.5–10.1)
CHLORIDE SERPL-SCNC: 108 MMOL/L — SIGNIFICANT CHANGE UP (ref 96–108)
CO2 SERPL-SCNC: 26 MMOL/L — SIGNIFICANT CHANGE UP (ref 22–31)
CREAT SERPL-MCNC: 2.47 MG/DL — HIGH (ref 0.5–1.3)
EGFR: 19 ML/MIN/1.73M2 — LOW
GLUCOSE BLDC GLUCOMTR-MCNC: 103 MG/DL — HIGH (ref 70–99)
GLUCOSE BLDC GLUCOMTR-MCNC: 120 MG/DL — HIGH (ref 70–99)
GLUCOSE BLDC GLUCOMTR-MCNC: 131 MG/DL — HIGH (ref 70–99)
GLUCOSE BLDC GLUCOMTR-MCNC: 137 MG/DL — HIGH (ref 70–99)
GLUCOSE BLDC GLUCOMTR-MCNC: 140 MG/DL — HIGH (ref 70–99)
GLUCOSE BLDC GLUCOMTR-MCNC: 141 MG/DL — HIGH (ref 70–99)
GLUCOSE SERPL-MCNC: 134 MG/DL — HIGH (ref 70–99)
HCT VFR BLD CALC: 24.6 % — LOW (ref 34.5–45)
HGB BLD-MCNC: 7.7 G/DL — LOW (ref 11.5–15.5)
MAGNESIUM SERPL-MCNC: 1.8 MG/DL — SIGNIFICANT CHANGE UP (ref 1.6–2.6)
MCHC RBC-ENTMCNC: 30.4 PG — SIGNIFICANT CHANGE UP (ref 27–34)
MCHC RBC-ENTMCNC: 31.3 G/DL — LOW (ref 32–36)
MCV RBC AUTO: 97.2 FL — SIGNIFICANT CHANGE UP (ref 80–100)
NRBC # BLD: 0 /100 WBCS — SIGNIFICANT CHANGE UP (ref 0–0)
PLATELET # BLD AUTO: 102 K/UL — LOW (ref 150–400)
POTASSIUM SERPL-MCNC: 3.4 MMOL/L — LOW (ref 3.5–5.3)
POTASSIUM SERPL-SCNC: 3.4 MMOL/L — LOW (ref 3.5–5.3)
PROT SERPL-MCNC: 5.7 GM/DL — LOW (ref 6–8.3)
RBC # BLD: 2.53 M/UL — LOW (ref 3.8–5.2)
RBC # FLD: 17.1 % — HIGH (ref 10.3–14.5)
SODIUM SERPL-SCNC: 139 MMOL/L — SIGNIFICANT CHANGE UP (ref 135–145)
WBC # BLD: 4.22 K/UL — SIGNIFICANT CHANGE UP (ref 3.8–10.5)
WBC # FLD AUTO: 4.22 K/UL — SIGNIFICANT CHANGE UP (ref 3.8–10.5)

## 2024-10-23 PROCEDURE — 99232 SBSQ HOSP IP/OBS MODERATE 35: CPT

## 2024-10-23 PROCEDURE — 99233 SBSQ HOSP IP/OBS HIGH 50: CPT

## 2024-10-23 RX ORDER — GLYCERIN/PROPYLENE GLYCOL 0.6 %-0.6%
1 DROPPERETTE, SINGLE-USE DROP DISPENSER OPHTHALMIC (EYE) DAILY
Refills: 0 | Status: DISCONTINUED | OUTPATIENT
Start: 2024-10-23 | End: 2024-10-23

## 2024-10-23 RX ORDER — POTASSIUM CHLORIDE 10 MEQ
40 TABLET, EXTENDED RELEASE ORAL ONCE
Refills: 0 | Status: COMPLETED | OUTPATIENT
Start: 2024-10-23 | End: 2024-10-23

## 2024-10-23 RX ORDER — GLYCERIN/PROPYLENE GLYCOL 0.6 %-0.6%
1 DROPPERETTE, SINGLE-USE DROP DISPENSER OPHTHALMIC (EYE) DAILY
Refills: 0 | Status: DISCONTINUED | OUTPATIENT
Start: 2024-10-23 | End: 2024-10-24

## 2024-10-23 RX ADMIN — Medication 5 MILLIGRAM(S): at 05:59

## 2024-10-23 RX ADMIN — HEPARIN SODIUM 5000 UNIT(S): 10000 INJECTION INTRAVENOUS; SUBCUTANEOUS at 17:06

## 2024-10-23 RX ADMIN — MONTELUKAST SODIUM 10 MILLIGRAM(S): 10 TABLET, FILM COATED ORAL at 13:32

## 2024-10-23 RX ADMIN — CARVEDILOL 12.5 MILLIGRAM(S): 25 TABLET, FILM COATED ORAL at 05:59

## 2024-10-23 RX ADMIN — NYSTATIN 1 APPLICATION(S): 100000 POWDER TOPICAL at 06:19

## 2024-10-23 RX ADMIN — MEMANTINE HYDROCHLORIDE 10 MILLIGRAM(S): 21 CAPSULE, EXTENDED RELEASE ORAL at 13:23

## 2024-10-23 RX ADMIN — PIPERACILLIN AND TAZOBACTAM 25 GRAM(S): .5; 4 INJECTION, POWDER, LYOPHILIZED, FOR SOLUTION INTRAVENOUS at 22:11

## 2024-10-23 RX ADMIN — Medication 25 MICROGRAM(S): at 05:59

## 2024-10-23 RX ADMIN — Medication 650 MILLIGRAM(S): at 08:15

## 2024-10-23 RX ADMIN — HEPARIN SODIUM 5000 UNIT(S): 10000 INJECTION INTRAVENOUS; SUBCUTANEOUS at 06:18

## 2024-10-23 RX ADMIN — NYSTATIN 1 APPLICATION(S): 100000 POWDER TOPICAL at 17:00

## 2024-10-23 RX ADMIN — Medication 40 MILLIGRAM(S): at 08:15

## 2024-10-23 RX ADMIN — PIPERACILLIN AND TAZOBACTAM 25 GRAM(S): .5; 4 INJECTION, POWDER, LYOPHILIZED, FOR SOLUTION INTRAVENOUS at 05:59

## 2024-10-23 RX ADMIN — CARVEDILOL 12.5 MILLIGRAM(S): 25 TABLET, FILM COATED ORAL at 17:07

## 2024-10-23 RX ADMIN — Medication 75 MILLILITER(S): at 13:32

## 2024-10-23 RX ADMIN — Medication 50 MILLIGRAM(S): at 13:21

## 2024-10-23 RX ADMIN — Medication 40 MILLIEQUIVALENT(S): at 13:20

## 2024-10-23 RX ADMIN — Medication 75 MILLILITER(S): at 01:20

## 2024-10-23 RX ADMIN — PANTOPRAZOLE SODIUM 40 MILLIGRAM(S): 40 TABLET, DELAYED RELEASE ORAL at 05:59

## 2024-10-23 RX ADMIN — CLOPIDOGREL 75 MILLIGRAM(S): 75 TABLET ORAL at 13:22

## 2024-10-23 RX ADMIN — PIPERACILLIN AND TAZOBACTAM 25 GRAM(S): .5; 4 INJECTION, POWDER, LYOPHILIZED, FOR SOLUTION INTRAVENOUS at 13:32

## 2024-10-23 RX ADMIN — Medication 1 DROP(S): at 19:19

## 2024-10-23 NOTE — PROGRESS NOTE ADULT - ASSESSMENT
A/P    84 year old Croatian speaking  female with a PMH of DM, HTN, Gout, CVA (R-hemiplegia), Hypothyroidism, GERD, CDK, stress incontinence presents to the ED for acute onset of SOB associated with chills & shivering started 30 minutes prior to ED arrival. Also endorses LUQ abdominal pain with “bloating” sensation. Patient became mildly hypoxic in RA saturating 92% & was placed on 2L-NC. In the ED patient met SIRS criteria with Fever T-max: 101.4, Tachycardia- HR:126, Tachypneic- HR:23. Received 1L-NS, Ceftriaxone, Azithromycin, Zosyn and Tylenol.          Problem/Plan - 1:  ·  Problem: Systemic inflammatory response syndrome (SIRS).  ·  Plan: In the ED patient met SIRS criteria with Fever T-max: 101.4, Tachycardia- HR:126, Tachypneic- HR:23.   Received 1L-NS, Ceftriaxone, Azithromycin, Zosyn and Tylenol.  - IV-NS   - Zosyn   - Tylenol for fever   - F/U Culture results.     Problem/Plan - 2:  ·  Problem: Abdominal pain.  ·  Plan: CT-ABD: Mild intrahepatic and extra hepatic biliary ductal dilatation with common bile duct tapering distally towards the ampulla and mild diffuse pancreatic ductal dilatation without obstructing radiopaque stone or mass identified. 1.1 cm irregular low-density lesion in the pancreatic tail with mild surrounding inflammatory change  - NPO  - Pain Management   - MRCP read pending  - GI following  - DVT Prophylaxis.     Problem/Plan - 3:  ·  Problem: Hypertension.  ·  Plan: Normotensive   Continue home meds   - Amlodipine 5mg  - Carvedilol 12.5mg.     Problem/Plan - 4:  ·  Problem: Diabetes mellitus.   ·  Plan: ISS with hypoglycemia protocol   - F/U A1C.     Problem/Plan - 5:  . NSTEMI:  - Hemodynamically stable, TTE report pending, cardiology following    Kiara Waters MD

## 2024-10-23 NOTE — PROGRESS NOTE ADULT - SUBJECTIVE AND OBJECTIVE BOX
Patient is a 84y old  Female who presents with a chief complaint of acute onset of SOB    PAST MEDICAL & SURGICAL HISTORY:  Hypertension    Chronic kidney disease    anemia    PVD    Type 2 diabetes mellitus    Gout    CVA (R-hemiplegia)    Hypothyroidism    Former smoker    Dyspepsia    S/P  section    H/O tubal ligation    INTERVAL HISTORY:  	  MEDICATIONS:  MEDICATIONS  (STANDING):  allopurinol 50 milliGRAM(s) Oral <User Schedule>  amLODIPine   Tablet 5 milliGRAM(s) Oral daily  carvedilol 12.5 milliGRAM(s) Oral every 12 hours  clopidogrel Tablet 75 milliGRAM(s) Oral daily  heparin   Injectable 5000 Unit(s) SubCutaneous every 12 hours  insulin lispro (ADMELOG) corrective regimen sliding scale   SubCutaneous every 6 hours  levothyroxine 25 MICROGram(s) Oral daily  memantine 10 milliGRAM(s) Oral daily  montelukast 10 milliGRAM(s) Oral daily  nystatin Powder 1 Application(s) Topical two times a day  pantoprazole    Tablet 40 milliGRAM(s) Oral before breakfast  piperacillin/tazobactam IVPB.. 3.375 Gram(s) IV Intermittent every 8 hours    MEDICATIONS  (PRN):  acetaminophen     Tablet .. 650 milliGRAM(s) Oral every 6 hours PRN Temp greater or equal to 38C (100.4F), Mild Pain (1 - 3)  aluminum hydroxide/magnesium hydroxide/simethicone Suspension 30 milliLiter(s) Oral every 4 hours PRN Dyspepsia  dextrose Oral Gel 15 Gram(s) Oral once PRN Blood Glucose LESS THAN 70 milliGRAM(s)/deciliter  melatonin 3 milliGRAM(s) Oral at bedtime PRN Insomnia  ondansetron Injectable 4 milliGRAM(s) IV Push every 8 hours PRN Nausea and/or Vomiting    Vitals:  T(F): 97.7 (10-23-24 @ 05:13), Max: 98.3 (10-22-24 @ 23:32)  HR: 56 (10-23-24 @ 07:00) (56 - 74)  BP: 131/66 (10-23-24 @ 05:13) (129/69 - 148/73)  RR: 18 (10-23-24 @ 05:13) (18 - 18)  SpO2: 97% (10-23-24 @ 05:13) (96% - 100%)    10-22 @ 07:01  -  10-23 @ 07:00  --------------------------------------------------------  IN:    dextrose 5% + sodium chloride 0.45%: 900 mL    IV PiggyBack: 200 mL  Total IN: 1100 mL    OUT:    Voided (mL): 1950 mL  Total OUT: 1950 mL    Total NET: -850 mL    Weight (kg): 72 (10-21 @ 09:54)  BMI (kg/m2): 31 (10-21 @ 09:54)    PHYSICAL EXAM:  Neuro: Awake, responsive  CV: S1 S2 RRR  Lungs: CTABL  GI: Soft, BS +, ND, NT  Extremities: No edema    TELEMETRY: sinus    RADIOLOGY: < from: CT Abdomen and Pelvis w/ IV Cont (10.21.24 @ 04:43) >  LOWER CHEST: Cardiomegaly. Aortic valve and coronary artery   calcifications. Bibasilar subsegmental atelectasis.    LIVER: Hepatic cysts and subcentimeter low-density lesions.  BILE DUCTS: Mild intrahepatic and extra hepatic biliary ductal dilatation   with common bile duct measuring up to 1.3 cm and tapering distally   towards the ampulla without obstructing stone identified.  GALLBLADDER: Cholelithiasis.  SPLEEN: Within normal limits.  PANCREAS: Diffusely dilated pancreatic duct measuring approximately 5 mm.   3 mm stone versus parenchymal calcification in the pancreatic   head/uncinate process. 1.1 cm irregular low-density lesion in the   pancreatic tail with mild surrounding inflammatory changes.  ADRENALS: Within normal limits.  KIDNEYS/URETERS: Atrophic left kidney.    BLADDER: Excreted contrast.  REPRODUCTIVE ORGANS: Uterus and adnexa within normal limits.    BOWEL: Small hiatal hernia. No bowel obstruction. Colonic diverticulosis.   Appendix is normal.  PERITONEUM/RETROPERITONEUM: Within normal limits.  VESSELS: Atherosclerotic changes. Redemonstrated abnormal course of the   splenic vein.  LYMPH NODES: No lymphadenopathy.  ABDOMINAL WALL: Within normal limits.  BONES: Degenerative changes. Scoliotic curvature. Decreased bone mineral   density. Retrolisthesis T12 on L1 and L1 on L2.    IMPRESSION:  Mild intrahepatic and extra hepatic biliary ductal dilatation with common   bile duct tapering distally towards the ampulla and milddiffuse   pancreatic ductal dilatation without obstructing radiopaque stone or mass   identified.    1.1 cm irregular low-density lesion in the pancreatic tail with mild   surrounding inflammatory changes. Recommend correlation with lipase   levels. Recommend further evaluation with outpatient MRI/MRCP to exclude   solid mass.    < end of copied text >    DIAGNOSTIC TESTING:    [ P] Echocardiogram:      LABS:	 	    CARDIAC MARKERS:  Troponin I, High Sensitivity Result: 655.3 ng/L (10-22 @ 06:52)  Troponin I, High Sensitivity Result: 1051.1 ng/L (10-21 @ 10:48)  Troponin I, High Sensitivity Result: 1114.3 ng/L (10-21 @ 06:05)  Troponin I, High Sensitivity Result: 159.6 ng/L (10-20 @ 22:50)    22 Oct 2024 06:52    142    |  113    |  35     ----------------------------<  67     4.4     |  21     |  2.20   21 Oct 2024 14:26    141    |  110    |  36     ----------------------------<  88     3.2     |  26     |  2.04   20 Oct 2024 22:50    143    |  109    |  36     ----------------------------<  98     3.3     |  26     |  1.84     Ca    8.8        22 Oct 2024 06:52  Mg     1.8       22 Oct 2024 06:52    TPro  5.6    /  Alb  2.6    /  TBili  0.6    /  DBili  x      /  AST  46     /  ALT  21     /  AlkPhos  40     22 Oct 2024 06:52                        7.7    4.22  )-----------( 102      ( 23 Oct 2024 07:58 )             24.6 ,                       8.5    5.89  )-----------( 115      ( 22 Oct 2024 06:52 )             26.9 ,                       8.8    10.82 )-----------( 127      ( 21 Oct 2024 14:26 )             27.1 ,                       8.9    8.47  )-----------( 143      ( 20 Oct 2024 22:50 )             26.9     Lipid Profile: 10-21 @ 14:26  HDL Chol:              37 mg/dL  Serum Chol:            102 mg/dL  Triglycerides:         100 mg/dL    INR: 1.13 ratio (10-20 @ 22:50)           Patient is a 84y old  Female who presents with a chief complaint of acute onset of SOB    PAST MEDICAL & SURGICAL HISTORY:  Hypertension    Chronic kidney disease    anemia    PVD    Type 2 diabetes mellitus    Gout    CVA (R-hemiplegia)    Hypothyroidism    Former smoker    Dyspepsia    S/P  section    H/O tubal ligation    INTERVAL HISTORY: no chest pain, mild abd discomfort  	  MEDICATIONS:  MEDICATIONS  (STANDING):  allopurinol 50 milliGRAM(s) Oral <User Schedule>  amLODIPine   Tablet 5 milliGRAM(s) Oral daily  carvedilol 12.5 milliGRAM(s) Oral every 12 hours  clopidogrel Tablet 75 milliGRAM(s) Oral daily  heparin   Injectable 5000 Unit(s) SubCutaneous every 12 hours  insulin lispro (ADMELOG) corrective regimen sliding scale   SubCutaneous every 6 hours  levothyroxine 25 MICROGram(s) Oral daily  memantine 10 milliGRAM(s) Oral daily  montelukast 10 milliGRAM(s) Oral daily  nystatin Powder 1 Application(s) Topical two times a day  pantoprazole    Tablet 40 milliGRAM(s) Oral before breakfast  piperacillin/tazobactam IVPB.. 3.375 Gram(s) IV Intermittent every 8 hours    MEDICATIONS  (PRN):  acetaminophen     Tablet .. 650 milliGRAM(s) Oral every 6 hours PRN Temp greater or equal to 38C (100.4F), Mild Pain (1 - 3)  aluminum hydroxide/magnesium hydroxide/simethicone Suspension 30 milliLiter(s) Oral every 4 hours PRN Dyspepsia  dextrose Oral Gel 15 Gram(s) Oral once PRN Blood Glucose LESS THAN 70 milliGRAM(s)/deciliter  melatonin 3 milliGRAM(s) Oral at bedtime PRN Insomnia  ondansetron Injectable 4 milliGRAM(s) IV Push every 8 hours PRN Nausea and/or Vomiting    Vitals:  T(F): 97.7 (10-23-24 @ 05:13), Max: 98.3 (10-22-24 @ 23:32)  HR: 56 (10-23-24 @ 07:00) (56 - 74)  BP: 131/66 (10-23-24 @ 05:13) (129/69 - 148/73)  RR: 18 (10-23-24 @ 05:13) (18 - 18)  SpO2: 97% (10-23-24 @ 05:13) (96% - 100%)    10-22 @ 07:01  -  10-23 @ 07:00  --------------------------------------------------------  IN:    dextrose 5% + sodium chloride 0.45%: 900 mL    IV PiggyBack: 200 mL  Total IN: 1100 mL    OUT:    Voided (mL): 1950 mL  Total OUT: 1950 mL    Total NET: -850 mL    Weight (kg): 72 (10-21 @ 09:54)  BMI (kg/m2): 31 (10-21 @ 09:54)    PHYSICAL EXAM:  Neuro: Awake, responsive  CV: S1 S2 RRR +SM  Lungs: diminished to bases, no rales, no wheezing   GI: Soft, BS +, ND, NT  Extremities: No edema    TELEMETRY: sinus    RADIOLOGY:   < from: CT Abdomen and Pelvis w/ IV Cont (10.21.24 @ 04:43) >  LOWER CHEST: Cardiomegaly. Aortic valve and coronary artery   calcifications. Bibasilar subsegmental atelectasis.    LIVER: Hepatic cysts and subcentimeter low-density lesions.  BILE DUCTS: Mild intrahepatic and extra hepatic biliary ductal dilatation   with common bile duct measuring up to 1.3 cm and tapering distally   towards the ampulla without obstructing stone identified.  GALLBLADDER: Cholelithiasis.  SPLEEN: Within normal limits.  PANCREAS: Diffusely dilated pancreatic duct measuring approximately 5 mm.   3 mm stone versus parenchymal calcification in the pancreatic   head/uncinate process. 1.1 cm irregular low-density lesion in the   pancreatic tail with mild surrounding inflammatory changes.  ADRENALS: Within normal limits.  KIDNEYS/URETERS: Atrophic left kidney.    BLADDER: Excreted contrast.  REPRODUCTIVE ORGANS: Uterus and adnexa within normal limits.    BOWEL: Small hiatal hernia. No bowel obstruction. Colonic diverticulosis.   Appendix is normal.  PERITONEUM/RETROPERITONEUM: Within normal limits.  VESSELS: Atherosclerotic changes. Redemonstrated abnormal course of the   splenic vein.  LYMPH NODES: No lymphadenopathy.  ABDOMINAL WALL: Within normal limits.  BONES: Degenerative changes. Scoliotic curvature. Decreased bone mineral   density. Retrolisthesis T12 on L1 and L1 on L2.    IMPRESSION:  Mild intrahepatic and extra hepatic biliary ductal dilatation with common   bile duct tapering distally towards the ampulla and mild diffuse   pancreatic ductal dilatation without obstructing radiopaque stone or mass   identified.    1.1 cm irregular low-density lesion in the pancreatic tail with mild   surrounding inflammatory changes. Recommend correlation with lipase   levels. Recommend further evaluation with outpatient MRI/MRCP to exclude   solid mass.    < end of copied text >    DIAGNOSTIC TESTING:    [ x] Echocardiogram:    < from: TTE Echo Complete w/o Contrast w/ Doppler (10.22.24 @ 16:15) >   1. Left ventricular cavity is normal in size. Left ventricular wall   thickness is normal. Left ventricular systolic function is normal with an   ejection fraction of 66 % by Seo's method of disks.   2. Normal right ventricular cavity size.   3. Left atrium is moderately dilated.   4. The right atrium is normal in size.   5. Moderate mitral regurgitation. The mitral regurgitant jet is   centrally directed.   6. Structurally normal pulmonic valve with normal leaflet excursion.   7. No pericardial effusion seen.   8. Fibrocalcific aortic valve sclerosis without stenosis.   9. Mild pulmonic regurgitation.  10. Mild tricuspid regurgitation.  11. Moderate pulmonary hypertension.  12. Trace aortic regurgitation.    < end of copied text >    LABS:	 	    CARDIAC MARKERS:  Troponin I, High Sensitivity Result: 655.3 ng/L (10-22 @ 06:52)  Troponin I, High Sensitivity Result: 1051.1 ng/L (10-21 @ 10:48)  Troponin I, High Sensitivity Result: 1114.3 ng/L (10-21 @ 06:05)  Troponin I, High Sensitivity Result: 159.6 ng/L (10-20 @ 22:50)    22 Oct 2024 06:52    142    |  113    |  35     ----------------------------<  67     4.4     |  21     |  2.20   21 Oct 2024 14:26    141    |  110    |  36     ----------------------------<  88     3.2     |  26     |  2.04   20 Oct 2024 22:50    143    |  109    |  36     ----------------------------<  98     3.3     |  26     |  1.84     Ca    8.8        22 Oct 2024 06:52  Mg     1.8       22 Oct 2024 06:52    TPro  5.6    /  Alb  2.6    /  TBili  0.6    /  DBili  x      /  AST  46     /  ALT  21     /  AlkPhos  40     22 Oct 2024 06:52                        7.7    4.22  )-----------( 102      ( 23 Oct 2024 07:58 )             24.6 ,                       8.5    5.89  )-----------( 115      ( 22 Oct 2024 06:52 )             26.9 ,                       8.8    10.82 )-----------( 127      ( 21 Oct 2024 14:26 )             27.1 ,                       8.9    8.47  )-----------( 143      ( 20 Oct 2024 22:50 )             26.9     Lipid Profile: 10-21 @ 14:26  HDL Chol:              37 mg/dL  Serum Chol:            102 mg/dL  Triglycerides:         100 mg/dL    INR: 1.13 ratio (10-20 @ 22:50)

## 2024-10-23 NOTE — PROGRESS NOTE ADULT - ASSESSMENT
84 year old Danish speaking  female with a PMH of DM, HTN, Gout, CVA (R-hemiplegia), Hypothyroidism, GERD, CDK, anemia, stress incontinence presents to the ED for acute onset of SOB associated with chills & shivering started 30 minutes prior to ED arrival. Also endorses LUQ abdominal pain with “bloating” sensation. Patient became mildly hypoxic in RA saturating 92% & was placed on 2L-NC. In the ED patient met SIRS criteria with Fever T-max: 101.4  CT-ABD: Mild intrahepatic and extra hepatic biliary ductal dilatation with common bile duct tapering distally towards the ampulla and mild diffuse pancreatic ductal dilatation without obstructing radiopaque stone or mass identified. 1.1 cm irregular low-density lesion in the pancreatic tail with mild surrounding inflammatory changes.    SIRS with hypoxemia  Abdominal pain  Elevated troponins, no chest pain or active ECG changes, likely type II MI.    Plan:  TTE with preserved EF, mod MR/pHTN  Currently no evidence of active ischemia, trops downtrending   No immediate need for risk stratification, pt without any chest pain, Likely type 2 MI  cont on coreg and Norvasc for BP control   currently being managed for SIRS/abd pain, on ABx, MRCP result pending, GI following   on plavix for CVA/PVD hx, can restart statin home med as LFTs only slightly elevated   As per family pt on Lasix 40 po daily at home, started by her cardiologist Dr. Nain Pepper for LE edema 84 year old Slovenian speaking  female with a PMH of DM, HTN, Gout, CVA (R-hemiplegia), Hypothyroidism, GERD, CDK, anemia, stress incontinence presents to the ED for acute onset of SOB associated with chills & shivering started 30 minutes prior to ED arrival. Also endorses LUQ abdominal pain with “bloating” sensation. Patient became mildly hypoxic in RA saturating 92% & was placed on 2L-NC. In the ED patient met SIRS criteria with Fever T-max: 101.4  CT-ABD: Mild intrahepatic and extra hepatic biliary ductal dilatation with common bile duct tapering distally towards the ampulla and mild diffuse pancreatic ductal dilatation without obstructing radiopaque stone or mass identified. 1.1 cm irregular low-density lesion in the pancreatic tail with mild surrounding inflammatory changes.    SIRS with hypoxemia  Abdominal pain  Elevated troponins, no chest pain or active ECG changes, likely type II MI.    Plan:  TTE with preserved EF, mod MR/pHTN  Currently no evidence of active ischemia, trops downtrending   No immediate need for risk stratification, pt without any chest pain, Likely type 2 MI  cont on coreg and Norvasc for BP control   currently being managed for SIRS/abd pain, on ABx, MRCP result pending, GI following   on plavix for CVA/PVD hx, can restart statin home med as LFTs only slightly elevated   As per family pt on Lasix 40 po daily at home, started by her cardiologist Dr. Nain Pepper for LE edema -> currently off lasix, creat 2.47, On IV hydration now

## 2024-10-23 NOTE — PROGRESS NOTE ADULT - SUBJECTIVE AND OBJECTIVE BOX
Patient is a 84y old  Female who presents with a chief complaint of SIRS with CBD Dilation (23 Oct 2024 10:12)      INTERVAL HPI/OVERNIGHT EVENTS: No acute events overnight, afebrile, abdominal pain almost resolved.     MEDICATIONS  (STANDING):  allopurinol 50 milliGRAM(s) Oral <User Schedule>  amLODIPine   Tablet 5 milliGRAM(s) Oral daily  carvedilol 12.5 milliGRAM(s) Oral every 12 hours  clopidogrel Tablet 75 milliGRAM(s) Oral daily  dextrose 5% + sodium chloride 0.45%. 1000 milliLiter(s) (75 mL/Hr) IV Continuous <Continuous>  dextrose 5%. 1000 milliLiter(s) (50 mL/Hr) IV Continuous <Continuous>  dextrose 50% Injectable 25 Gram(s) IV Push once  glucagon  Injectable 1 milliGRAM(s) IntraMuscular once  heparin   Injectable 5000 Unit(s) SubCutaneous every 12 hours  insulin lispro (ADMELOG) corrective regimen sliding scale   SubCutaneous every 6 hours  levothyroxine 25 MICROGram(s) Oral daily  memantine 10 milliGRAM(s) Oral daily  montelukast 10 milliGRAM(s) Oral daily  nystatin Powder 1 Application(s) Topical two times a day  pantoprazole    Tablet 40 milliGRAM(s) Oral before breakfast  piperacillin/tazobactam IVPB.. 3.375 Gram(s) IV Intermittent every 8 hours    MEDICATIONS  (PRN):  acetaminophen     Tablet .. 650 milliGRAM(s) Oral every 6 hours PRN Temp greater or equal to 38C (100.4F), Mild Pain (1 - 3)  aluminum hydroxide/magnesium hydroxide/simethicone Suspension 30 milliLiter(s) Oral every 4 hours PRN Dyspepsia  dextrose Oral Gel 15 Gram(s) Oral once PRN Blood Glucose LESS THAN 70 milliGRAM(s)/deciliter  melatonin 3 milliGRAM(s) Oral at bedtime PRN Insomnia  ondansetron Injectable 4 milliGRAM(s) IV Push every 8 hours PRN Nausea and/or Vomiting      Allergies    No Known Allergies    Intolerances        REVIEW OF SYSTEMS:  CONSTITUTIONAL: +ve for fatigue  EYES: No eye pain, visual disturbances, or discharge  ENMT:  No difficulty hearing, tinnitus, vertigo; No sinus or throat pain  NECK: No pain or stiffness      Vital Signs Last 24 Hrs  T(C): 36.5 (23 Oct 2024 05:13), Max: 36.8 (22 Oct 2024 23:32)  T(F): 97.7 (23 Oct 2024 05:13), Max: 98.3 (22 Oct 2024 23:32)  HR: 56 (23 Oct 2024 07:00) (56 - 74)  BP: 131/66 (23 Oct 2024 05:13) (131/66 - 148/73)  BP(mean): --  RR: 18 (23 Oct 2024 05:13) (18 - 18)  SpO2: 97% (23 Oct 2024 05:13) (96% - 100%)    Parameters below as of 23 Oct 2024 05:13  Patient On (Oxygen Delivery Method): nasal cannula        PHYSICAL EXAM:    HEAD:  Atraumatic, Normocephalic  EYES: EOMI, PERRLA, conjunctiva and sclera clear  ENMT: No tonsillar erythema, exudates, or enlargement; Moist mucous membranes, Good dentition, No lesions  NECK: Supple, No JVD, Normal thyroid      LABS:                        7.7    4.22  )-----------( 102      ( 23 Oct 2024 07:58 )             24.6     10-22    142  |  113[H]  |  35[H]  ----------------------------<  67[L]  4.4   |  21[L]  |  2.20[H]    Ca    8.8      22 Oct 2024 06:52  Mg     1.8     10-22    TPro  5.6[L]  /  Alb  2.6[L]  /  TBili  0.6  /  DBili  x   /  AST  46[H]  /  ALT  21  /  AlkPhos  40  10-22      Urinalysis Basic - ( 22 Oct 2024 06:52 )    Color: x / Appearance: x / SG: x / pH: x  Gluc: 67 mg/dL / Ketone: x  / Bili: x / Urobili: x   Blood: x / Protein: x / Nitrite: x   Leuk Esterase: x / RBC: x / WBC x   Sq Epi: x / Non Sq Epi: x / Bacteria: x      CAPILLARY BLOOD GLUCOSE      POCT Blood Glucose.: 141 mg/dL (23 Oct 2024 09:16)  POCT Blood Glucose.: 103 mg/dL (23 Oct 2024 05:57)  POCT Blood Glucose.: 120 mg/dL (23 Oct 2024 00:34)  POCT Blood Glucose.: 112 mg/dL (22 Oct 2024 21:04)  POCT Blood Glucose.: 86 mg/dL (22 Oct 2024 17:04)  POCT Blood Glucose.: 81 mg/dL (22 Oct 2024 11:32)

## 2024-10-24 ENCOUNTER — TRANSCRIPTION ENCOUNTER (OUTPATIENT)
Age: 84
End: 2024-10-24

## 2024-10-24 VITALS
SYSTOLIC BLOOD PRESSURE: 147 MMHG | TEMPERATURE: 98 F | DIASTOLIC BLOOD PRESSURE: 79 MMHG | OXYGEN SATURATION: 96 % | RESPIRATION RATE: 18 BRPM | HEART RATE: 64 BPM

## 2024-10-24 LAB
ALBUMIN SERPL ELPH-MCNC: 2.5 G/DL — LOW (ref 3.3–5)
ALP SERPL-CCNC: 47 U/L — SIGNIFICANT CHANGE UP (ref 40–120)
ALT FLD-CCNC: 21 U/L — SIGNIFICANT CHANGE UP (ref 12–78)
ANION GAP SERPL CALC-SCNC: 7 MMOL/L — SIGNIFICANT CHANGE UP (ref 5–17)
AST SERPL-CCNC: 43 U/L — HIGH (ref 15–37)
BILIRUB SERPL-MCNC: 0.5 MG/DL — SIGNIFICANT CHANGE UP (ref 0.2–1.2)
BUN SERPL-MCNC: 34 MG/DL — HIGH (ref 7–23)
CALCIUM SERPL-MCNC: 8.7 MG/DL — SIGNIFICANT CHANGE UP (ref 8.5–10.1)
CHLORIDE SERPL-SCNC: 108 MMOL/L — SIGNIFICANT CHANGE UP (ref 96–108)
CO2 SERPL-SCNC: 26 MMOL/L — SIGNIFICANT CHANGE UP (ref 22–31)
CREAT SERPL-MCNC: 2.41 MG/DL — HIGH (ref 0.5–1.3)
EGFR: 19 ML/MIN/1.73M2 — LOW
GLUCOSE BLDC GLUCOMTR-MCNC: 115 MG/DL — HIGH (ref 70–99)
GLUCOSE BLDC GLUCOMTR-MCNC: 116 MG/DL — HIGH (ref 70–99)
GLUCOSE BLDC GLUCOMTR-MCNC: 137 MG/DL — HIGH (ref 70–99)
GLUCOSE SERPL-MCNC: 109 MG/DL — HIGH (ref 70–99)
HCT VFR BLD CALC: 27.9 % — LOW (ref 34.5–45)
HGB BLD-MCNC: 9 G/DL — LOW (ref 11.5–15.5)
MCHC RBC-ENTMCNC: 31.5 PG — SIGNIFICANT CHANGE UP (ref 27–34)
MCHC RBC-ENTMCNC: 32.3 G/DL — SIGNIFICANT CHANGE UP (ref 32–36)
MCV RBC AUTO: 97.6 FL — SIGNIFICANT CHANGE UP (ref 80–100)
NRBC # BLD: 0 /100 WBCS — SIGNIFICANT CHANGE UP (ref 0–0)
PLATELET # BLD AUTO: 126 K/UL — LOW (ref 150–400)
POTASSIUM SERPL-MCNC: 3.6 MMOL/L — SIGNIFICANT CHANGE UP (ref 3.5–5.3)
POTASSIUM SERPL-SCNC: 3.6 MMOL/L — SIGNIFICANT CHANGE UP (ref 3.5–5.3)
PROT SERPL-MCNC: 5.7 GM/DL — LOW (ref 6–8.3)
RBC # BLD: 2.86 M/UL — LOW (ref 3.8–5.2)
RBC # FLD: 17 % — HIGH (ref 10.3–14.5)
SODIUM SERPL-SCNC: 141 MMOL/L — SIGNIFICANT CHANGE UP (ref 135–145)
WBC # BLD: 4.35 K/UL — SIGNIFICANT CHANGE UP (ref 3.8–10.5)
WBC # FLD AUTO: 4.35 K/UL — SIGNIFICANT CHANGE UP (ref 3.8–10.5)

## 2024-10-24 PROCEDURE — 99232 SBSQ HOSP IP/OBS MODERATE 35: CPT

## 2024-10-24 PROCEDURE — 99239 HOSP IP/OBS DSCHRG MGMT >30: CPT

## 2024-10-24 PROCEDURE — 99233 SBSQ HOSP IP/OBS HIGH 50: CPT

## 2024-10-24 RX ORDER — METRONIDAZOLE 250 MG/1
1 TABLET ORAL
Qty: 12 | Refills: 0
Start: 2024-10-24 | End: 2024-10-27

## 2024-10-24 RX ORDER — AMOXICILLIN AND CLAVULANATE POTASSIUM 600; 42.9 MG/5ML; MG/5ML
1 POWDER, FOR SUSPENSION ORAL
Qty: 8 | Refills: 0
Start: 2024-10-24 | End: 2024-10-27

## 2024-10-24 RX ADMIN — Medication 1 DROP(S): at 12:06

## 2024-10-24 RX ADMIN — CLOPIDOGREL 75 MILLIGRAM(S): 75 TABLET ORAL at 12:07

## 2024-10-24 RX ADMIN — MEMANTINE HYDROCHLORIDE 10 MILLIGRAM(S): 21 CAPSULE, EXTENDED RELEASE ORAL at 12:07

## 2024-10-24 RX ADMIN — NYSTATIN 1 APPLICATION(S): 100000 POWDER TOPICAL at 17:16

## 2024-10-24 RX ADMIN — Medication 25 MICROGRAM(S): at 06:21

## 2024-10-24 RX ADMIN — CARVEDILOL 12.5 MILLIGRAM(S): 25 TABLET, FILM COATED ORAL at 06:20

## 2024-10-24 RX ADMIN — HEPARIN SODIUM 5000 UNIT(S): 10000 INJECTION INTRAVENOUS; SUBCUTANEOUS at 17:15

## 2024-10-24 RX ADMIN — HEPARIN SODIUM 5000 UNIT(S): 10000 INJECTION INTRAVENOUS; SUBCUTANEOUS at 06:20

## 2024-10-24 RX ADMIN — PIPERACILLIN AND TAZOBACTAM 25 GRAM(S): .5; 4 INJECTION, POWDER, LYOPHILIZED, FOR SOLUTION INTRAVENOUS at 06:21

## 2024-10-24 RX ADMIN — MONTELUKAST SODIUM 10 MILLIGRAM(S): 10 TABLET, FILM COATED ORAL at 12:08

## 2024-10-24 RX ADMIN — Medication 75 MILLILITER(S): at 08:03

## 2024-10-24 RX ADMIN — NYSTATIN 1 APPLICATION(S): 100000 POWDER TOPICAL at 06:22

## 2024-10-24 RX ADMIN — PANTOPRAZOLE SODIUM 40 MILLIGRAM(S): 40 TABLET, DELAYED RELEASE ORAL at 06:22

## 2024-10-24 RX ADMIN — CARVEDILOL 12.5 MILLIGRAM(S): 25 TABLET, FILM COATED ORAL at 17:16

## 2024-10-24 RX ADMIN — Medication 5 MILLIGRAM(S): at 06:20

## 2024-10-24 NOTE — DISCHARGE NOTE PROVIDER - HOSPITAL COURSE
84 year old Latvian speaking  female with a PMH of DM, HTN, Gout, CVA (R-hemiplegia), Hypothyroidism, GERD, CDK, stress incontinence presents to the ED for acute onset of SOB associated with chills & shivering started 30 minutes prior to ED arrival. Also endorses LUQ abdominal pain with “bloating” sensation. Patient became mildly hypoxic in RA saturating 92% & was placed on 2L-NC. In the ED patient met SIRS criteria with Fever T-max: 101.4, Tachycardia- HR:126, Tachypneic- HR:23. Received 1L-NS, Ceftriaxone, Azithromycin, Zosyn and Tylenol.      Problem/Plan - 1:  ·  Problem: Systemic inflammatory response syndrome (SIRS).  ·  Plan: In the ED patient met SIRS criteria with Fever T-max: 101.4, Tachycardia- HR:126, Tachypneic- HR:23.  - S/P IV-NS , S/O Zosyn, can stop now  - Unclear etiology, pancreatitis (possibly resolved gallstone pancreatitis) vs cholangitis vs cholecystitis  - Pain improved, afebrile now     Problem/Plan - 2:  ·  Problem: Abdominal pain.  ·  Plan: CT-ABD: Mild intrahepatic and extra hepatic biliary ductal dilatation with common bile duct tapering distally towards the ampulla and mild diffuse pancreatic ductal dilatation without obstructing radiopaque stone or mass identified. 1.1 cm irregular low-density lesion in the pancreatic tail with mild surrounding inflammatory change  - Pain improved  - MRCP : Motion degraded examination. Mild biliary ductal dilatation without choledocholithiasis. A few stones within a dilated pancreatic duct.  - GI eval appreciated  - Diet advanced, tolerating   - Outpt follow up for cholecystectomy      Problem/Plan - 3:  ·  Problem: Hypertension.  ·  Plan: Normotensive   Continue home meds   - Amlodipine 5mg  - Carvedilol 12.5mg.     Problem/Plan - 4:  ·  Problem: Diabetes mellitus.   ·  Plan: ISS with hypoglycemia protocol   - F/U A1C 5.5  no med on dc dc     Problem/Plan - 5:  . NSTEMI:  - Hemodynamically stable,  TTE with preserved EF, mod MR/pHTN  Currently no evidence of active ischemia, trops downtrending   No immediate need for risk stratification, pt without any chest pain, Likely type 2 MI  on plavix for CVA/PVD hx, can restart statin home med as LFTs only slightly elevated   As per family pt on Lasix 40 po daily at home, started by her cardiologist Dr. Nain Pepper for LE edema -> currently off lasix, creat 2.41  Pt will follow up with her cardiologist     EDELMIRA on CKD 3:  - Cr overall stable  - Outpt follow up     84 year old Mauritian speaking  female with a PMH of DM, HTN, Gout, CVA (R-hemiplegia), Hypothyroidism, GERD, CDK, stress incontinence presents to the ED for acute onset of SOB associated with chills & shivering started 30 minutes prior to ED arrival. Also endorses LUQ abdominal pain with “bloating” sensation. Patient became mildly hypoxic in RA saturating 92% & was placed on 2L-NC. In the ED patient met SIRS criteria with Fever T-max: 101.4, Tachycardia- HR:126, Tachypneic- HR:23. Received 1L-NS, Ceftriaxone, Azithromycin, Zosyn and Tylenol.      Problem/Plan - 1:  ·  Problem: Systemic inflammatory response syndrome (SIRS).  ·  Plan: In the ED patient met SIRS criteria with Fever T-max: 101.4, Tachycardia- HR:126, Tachypneic- HR:23.  - S/P IV-NS , S/O Zosyn, PO abx for 4 more days  - Unclear etiology, pancreatitis (possibly resolved gallstone pancreatitis) vs cholangitis vs cholecystitis  - Pain improved, afebrile now     Problem/Plan - 2:  ·  Problem: Abdominal pain.  ·  Plan: CT-ABD: Mild intrahepatic and extra hepatic biliary ductal dilatation with common bile duct tapering distally towards the ampulla and mild diffuse pancreatic ductal dilatation without obstructing radiopaque stone or mass identified. 1.1 cm irregular low-density lesion in the pancreatic tail with mild surrounding inflammatory change  - Pain improved  - MRCP : Motion degraded examination. Mild biliary ductal dilatation without choledocholithiasis. A few stones within a dilated pancreatic duct.  - GI eval appreciated  - Diet advanced, tolerating   - Outpt follow up for cholecystectomy      Problem/Plan - 3:  ·  Problem: Hypertension.  ·  Plan: Normotensive   Continue home meds   - Amlodipine 5mg  - Carvedilol 12.5mg.     Problem/Plan - 4:  ·  Problem: Diabetes mellitus.   ·  Plan: ISS with hypoglycemia protocol   - F/U A1C 5.5  no med on dc dc     Problem/Plan - 5:  . NSTEMI:  - Hemodynamically stable,  TTE with preserved EF, mod MR/pHTN  Currently no evidence of active ischemia, trops downtrending   No immediate need for risk stratification, pt without any chest pain, Likely type 2 MI  on plavix for CVA/PVD hx, can restart statin home med as LFTs only slightly elevated   As per family pt on Lasix 40 po daily at home, started by her cardiologist Dr. Nain Pepper for LE edema -> currently off lasix, creat 2.41  Pt will follow up with her cardiologist     EDELMIRA on CKD 3:  - Cr overall stable  - Outpt follow up

## 2024-10-24 NOTE — PROGRESS NOTE ADULT - SUBJECTIVE AND OBJECTIVE BOX
AF on abx  Reports minimal RUQ pain left  Tolerating PO    T(C): 36.9 (10-24-24 @ 12:00), Max: 36.9 (10-24-24 @ 12:00)  HR: 82 (10-24-24 @ 12:00) (60 - 82)  BP: 132/79 (10-24-24 @ 12:00) (106/68 - 146/64)  RR: 18 (10-24-24 @ 12:00) (18 - 19)  SpO2: 98% (10-24-24 @ 12:00) (96% - 98%)    NAD  Anicteric  soft ND minimal RUQ ttp                          9.0    4.35  )-----------( 126      ( 24 Oct 2024 06:50 )             27.9   10-24    141  |  108  |  34[H]  ----------------------------<  109[H]  3.6   |  26  |  2.41[H]    Ca    8.7      24 Oct 2024 06:50  Mg     1.8     10-23    TPro  5.7[L]  /  Alb  2.5[L]  /  TBili  0.5  /  DBili  x   /  AST  43[H]  /  ALT  21  /  AlkPhos  47  10-24    Impression: Suspect passed gallstone causing resolved pancreatitis vs cholecystiyis - latter not supported on imaging.    Recommend:  - OP surgery follow up for consideration of cholecystectomy    Discussed with daughter at bedside  Discussed with hospitalist via Teams

## 2024-10-24 NOTE — PROGRESS NOTE ADULT - SUBJECTIVE AND OBJECTIVE BOX
Patient is a 84y old  Female who presents with a chief complaint of acute onset of SOB    PAST MEDICAL & SURGICAL HISTORY:  Hypertension    Chronic kidney disease    anemia    PVD    Type 2 diabetes mellitus    Gout    CVA (R-hemiplegia)    Hypothyroidism    Former smoker    Dyspepsia    S/P  section    H/O tubal ligation    INTERVAL HISTORY:  	  MEDICATIONS:  MEDICATIONS  (STANDING):  allopurinol 50 milliGRAM(s) Oral <User Schedule>  amLODIPine   Tablet 5 milliGRAM(s) Oral daily  artificial  tears Solution 1 Drop(s) Both EYES daily  carvedilol 12.5 milliGRAM(s) Oral every 12 hours  clopidogrel Tablet 75 milliGRAM(s) Oral daily  heparin   Injectable 5000 Unit(s) SubCutaneous every 12 hours  insulin lispro (ADMELOG) corrective regimen sliding scale   SubCutaneous every 6 hours  levothyroxine 25 MICROGram(s) Oral daily  memantine 10 milliGRAM(s) Oral daily  montelukast 10 milliGRAM(s) Oral daily  nystatin Powder 1 Application(s) Topical two times a day  pantoprazole    Tablet 40 milliGRAM(s) Oral before breakfast  piperacillin/tazobactam IVPB.. 3.375 Gram(s) IV Intermittent every 8 hours    MEDICATIONS  (PRN):  acetaminophen     Tablet .. 650 milliGRAM(s) Oral every 6 hours PRN Temp greater or equal to 38C (100.4F), Mild Pain (1 - 3)  aluminum hydroxide/magnesium hydroxide/simethicone Suspension 30 milliLiter(s) Oral every 4 hours PRN Dyspepsia  dextrose Oral Gel 15 Gram(s) Oral once PRN Blood Glucose LESS THAN 70 milliGRAM(s)/deciliter  melatonin 3 milliGRAM(s) Oral at bedtime PRN Insomnia  ondansetron Injectable 4 milliGRAM(s) IV Push every 8 hours PRN Nausea and/or Vomiting    Vitals:  T(F): 98.2 (10-24-24 @ 04:40), Max: 98.2 (10-24-24 @ 04:40)  HR: 70 (10-24-24 @ 04:40) (58 - 70)  BP: 146/64 (10-24-24 @ 04:40) (128/70 - 146/64)  RR: 18 (10-24-24 @ 04:40) (18 - 18)  SpO2: 96% (10-24-24 @ 04:40) (96% - 98%)    10-23 @ 07:01  -  10-24 @ 07:00  --------------------------------------------------------  IN:  Total IN: 0 mL    OUT:    Voided (mL): 550 mL  Total OUT: 550 mL    Total NET: -550 mL    Weight (kg): 72 (10-21 @ 09:54)  BMI (kg/m2): 31 (10-21 @ 09:54)    PHYSICAL EXAM:  Neuro: Awake, responsive  CV: S1 S2 RRR  Lungs: CTABL  GI: Soft, BS +, ND, NT  Extremities: No edema    TELEMETRY: sinus    RADIOLOGY: < from: CT Abdomen and Pelvis w/ IV Cont (10.21.24 @ 04:43) >  IMPRESSION:  Mild intrahepatic and extra hepatic biliary ductal dilatation with common   bile duct tapering distally towards the ampulla and milddiffuse   pancreatic ductal dilatation without obstructing radiopaque stone or mass   identified.    1.1 cm irregular low-density lesion in the pancreatic tail with mild   surrounding inflammatory changes. Recommend correlation with lipase   levels. Recommend further evaluation with outpatient MRI/MRCP to exclude   solid mass.    < end of copied text >  < from: Xray Chest 1 View-PORTABLE IMMEDIATE (10.20.24 @ 22:48) >   No radiographic evidence of active chest disease..    < end of copied text >    < from: CT Angio Chest PE Protocol w/ IV Cont (10.21.24 @ 01:36) >  Evaluation is limited to the segmental level in the lower lobes due to   motion artifact. Within this limitation, no pulmonary embolism is   identified.    Cholelithiasis and partially visualized dilated common bile duct.     < end of copied text >    DIAGNOSTIC TESTING:    [x] Echocardiogram:   < from: TTE Echo Complete w/o Contrast w/ Doppler (10.22.24 @ 16:15) >   1. Left ventricular cavity is normal in size. Left ventricular wall   thickness is normal. Left ventricular systolic function is normal with an   ejection fraction of 66 % by Seo's method of disks.   2. Normal right ventricular cavity size.   3. Left atrium is moderately dilated.   4. The right atrium is normal in size.   5. Moderate mitral regurgitation. The mitral regurgitant jet is   centrally directed.   6. Structurally normal pulmonic valve with normal leaflet excursion.   7. No pericardial effusion seen.   8. Fibrocalcific aortic valve sclerosis without stenosis.   9. Mild pulmonic regurgitation.  10. Mild tricuspid regurgitation.  11. Moderate pulmonary hypertension.  12. Trace aortic regurgitation.    < end of copied text >    LABS:	 	    CARDIAC MARKERS:  Troponin I, High Sensitivity Result: 655.3 ng/L (10-22 @ 06:52)  Troponin I, High Sensitivity Result: 1051.1 ng/L (10-21 @ 10:48)    24 Oct 2024 06:50    141    |  108    |  34     ----------------------------<  109    3.6     |  26     |  2.41   23 Oct 2024 12:05    139    |  108    |  33     ----------------------------<  134    3.4     |  26     |  2.47   22 Oct 2024 06:52    142    |  113    |  35     ----------------------------<  67     4.4     |  21     |  2.20     Ca    8.7        24 Oct 2024 06:50  Mg     1.8       23 Oct 2024 12:05    TPro  5.7    /  Alb  2.5    /  TBili  0.5    /  DBili  x      /  AST  43     /  ALT  21     /  AlkPhos  47     24 Oct 2024 06:50                        9.0    4.35  )-----------( 126      ( 24 Oct 2024 06:50 )             27.9 ,                       7.7    4.22  )-----------( 102      ( 23 Oct 2024 07:58 )             24.6 ,                       8.5    5.89  )-----------( 115      ( 22 Oct 2024 06:52 )             26.9 ,                       8.8    10.82 )-----------( 127      ( 21 Oct 2024 14:26 )             27.1   Lipid Profile: 10-21 @ 14:26  HDL Chol:              37 mg/dL  Serum Chol:            102 mg/dL  Triglycerides:         100 mg/dL                 Patient is a 84y old  Female who presents with a chief complaint of acute onset of SOB    PAST MEDICAL & SURGICAL HISTORY:  Hypertension    Chronic kidney disease    anemia    PVD    Type 2 diabetes mellitus    Gout    CVA (R-hemiplegia)    Hypothyroidism    Former smoker    Dyspepsia    S/P  section    H/O tubal ligation    INTERVAL HISTORY: in no distress, denies any chest pain or sob   	  MEDICATIONS:  MEDICATIONS  (STANDING):  allopurinol 50 milliGRAM(s) Oral <User Schedule>  amLODIPine   Tablet 5 milliGRAM(s) Oral daily  artificial  tears Solution 1 Drop(s) Both EYES daily  carvedilol 12.5 milliGRAM(s) Oral every 12 hours  clopidogrel Tablet 75 milliGRAM(s) Oral daily  heparin   Injectable 5000 Unit(s) SubCutaneous every 12 hours  insulin lispro (ADMELOG) corrective regimen sliding scale   SubCutaneous every 6 hours  levothyroxine 25 MICROGram(s) Oral daily  memantine 10 milliGRAM(s) Oral daily  montelukast 10 milliGRAM(s) Oral daily  nystatin Powder 1 Application(s) Topical two times a day  pantoprazole    Tablet 40 milliGRAM(s) Oral before breakfast  piperacillin/tazobactam IVPB.. 3.375 Gram(s) IV Intermittent every 8 hours    MEDICATIONS  (PRN):  acetaminophen     Tablet .. 650 milliGRAM(s) Oral every 6 hours PRN Temp greater or equal to 38C (100.4F), Mild Pain (1 - 3)  aluminum hydroxide/magnesium hydroxide/simethicone Suspension 30 milliLiter(s) Oral every 4 hours PRN Dyspepsia  dextrose Oral Gel 15 Gram(s) Oral once PRN Blood Glucose LESS THAN 70 milliGRAM(s)/deciliter  melatonin 3 milliGRAM(s) Oral at bedtime PRN Insomnia  ondansetron Injectable 4 milliGRAM(s) IV Push every 8 hours PRN Nausea and/or Vomiting    Vitals:  T(F): 98.2 (10-24-24 @ 04:40), Max: 98.2 (10-24-24 @ 04:40)  HR: 70 (10-24-24 @ 04:40) (58 - 70)  BP: 146/64 (10-24-24 @ 04:40) (128/70 - 146/64)  RR: 18 (10-24-24 @ 04:40) (18 - 18)  SpO2: 96% (10-24-24 @ 04:40) (96% - 98%)    10-23 @ 07:01  -  10-24 @ 07:00  --------------------------------------------------------  IN:  Total IN: 0 mL    OUT:    Voided (mL): 550 mL  Total OUT: 550 mL    Total NET: -550 mL    Weight (kg): 72 (10-21 @ 09:54)  BMI (kg/m2): 31 (10-21 @ 09:54)    PHYSICAL EXAM:  Neuro: Awake, responsive  CV: S1 S2 RRR + SM  Lungs: CTABL  GI: Soft, BS +, ND, mild LUQ tenderness   Extremities: No edema    TELEMETRY: sinus    RADIOLOGY:     < from: MR MRCP w/wo IV Cont (10.22.24 @ 15:30) >  Motion degraded examination.  Mild biliary ductal dilatation without choledocholithiasis.  A few stones within a dilated pancreatic duct.    < end of copied text >  < from: CT Abdomen and Pelvis w/ IV Cont (10.21.24 @ 04:43) >  IMPRESSION:  Mild intrahepatic and extra hepatic biliary ductal dilatation with common   bile duct tapering distally towards the ampulla and milddiffuse   pancreatic ductal dilatation without obstructing radiopaque stone or mass   identified.    1.1 cm irregular low-density lesion in the pancreatic tail with mild   surrounding inflammatory changes. Recommend correlation with lipase   levels. Recommend further evaluation with outpatient MRI/MRCP to exclude   solid mass.    < end of copied text >  < from: Xray Chest 1 View-PORTABLE IMMEDIATE (10.20.24 @ 22:48) >   No radiographic evidence of active chest disease..    < end of copied text >    < from: CT Angio Chest PE Protocol w/ IV Cont (10.21.24 @ 01:36) >  Evaluation is limited to the segmental level in the lower lobes due to   motion artifact. Within this limitation, no pulmonary embolism is   identified.    Cholelithiasis and partially visualized dilated common bile duct.     < end of copied text >    DIAGNOSTIC TESTING:    [x] Echocardiogram:   < from: TTE Echo Complete w/o Contrast w/ Doppler (10.22.24 @ 16:15) >   1. Left ventricular cavity is normal in size. Left ventricular wall   thickness is normal. Left ventricular systolic function is normal with an   ejection fraction of 66 % by Seo's method of disks.   2. Normal right ventricular cavity size.   3. Left atrium is moderately dilated.   4. The right atrium is normal in size.   5. Moderate mitral regurgitation. The mitral regurgitant jet is   centrally directed.   6. Structurally normal pulmonic valve with normal leaflet excursion.   7. No pericardial effusion seen.   8. Fibrocalcific aortic valve sclerosis without stenosis.   9. Mild pulmonic regurgitation.  10. Mild tricuspid regurgitation.  11. Moderate pulmonary hypertension.  12. Trace aortic regurgitation.    < end of copied text >    LABS:	 	    CARDIAC MARKERS:  Troponin I, High Sensitivity Result: 655.3 ng/L (10-22 @ 06:52)  Troponin I, High Sensitivity Result: 1051.1 ng/L (10-21 @ 10:48)    24 Oct 2024 06:50    141    |  108    |  34     ----------------------------<  109    3.6     |  26     |  2.41   23 Oct 2024 12:05    139    |  108    |  33     ----------------------------<  134    3.4     |  26     |  2.47   22 Oct 2024 06:52    142    |  113    |  35     ----------------------------<  67     4.4     |  21     |  2.20     Ca    8.7        24 Oct 2024 06:50  Mg     1.8       23 Oct 2024 12:05    TPro  5.7    /  Alb  2.5    /  TBili  0.5    /  DBili  x      /  AST  43     /  ALT  21     /  AlkPhos  47     24 Oct 2024 06:50                        9.0    4.35  )-----------( 126      ( 24 Oct 2024 06:50 )             27.9 ,                       7.7    4.22  )-----------( 102      ( 23 Oct 2024 07:58 )             24.6 ,                       8.5    5.89  )-----------( 115      ( 22 Oct 2024 06:52 )             26.9 ,                       8.8    10.82 )-----------( 127      ( 21 Oct 2024 14:26 )             27.1   Lipid Profile: 10-21 @ 14:26  HDL Chol:              37 mg/dL  Serum Chol:            102 mg/dL  Triglycerides:         100 mg/dL

## 2024-10-24 NOTE — DISCHARGE NOTE PROVIDER - PROVIDER TOKENS
PROVIDER:[TOKEN:[[id number]]],FREE:[LAST:[pcp],PHONE:[(   )    -],FAX:[(   )    -]],PROVIDER:[TOKEN:[[id number]]]

## 2024-10-24 NOTE — PROGRESS NOTE ADULT - ASSESSMENT
84 year old Romansh speaking  female with a PMH of DM, HTN, Gout, CVA (R-hemiplegia), Hypothyroidism, GERD, CDK, anemia, stress incontinence presents to the ED for acute onset of SOB associated with chills & shivering started 30 minutes prior to ED arrival. Also endorses LUQ abdominal pain with “bloating” sensation. Patient became mildly hypoxic in RA saturating 92% & was placed on 2L-NC. In the ED patient met SIRS criteria with Fever T-max: 101.4  CT-ABD: Mild intrahepatic and extra hepatic biliary ductal dilatation with common bile duct tapering distally towards the ampulla and mild diffuse pancreatic ductal dilatation without obstructing radiopaque stone or mass identified. 1.1 cm irregular low-density lesion in the pancreatic tail with mild surrounding inflammatory changes.    SIRS with hypoxemia  Abdominal pain  Elevated troponins, no chest pain or active ECG changes, likely type II MI.    Plan:  TTE with preserved EF, mod MR/pHTN  Currently no evidence of active ischemia, trops downtrending   No immediate need for risk stratification, pt without any chest pain, Likely type 2 MI  cont on coreg and Norvasc for BP control   currently being managed for SIRS/abd pain, on ABx, MRCP: Mild biliary ductal dilatation without choledocholithiasis. A few stones within a dilated pancreatic duct. GI following   on plavix for CVA/PVD hx, can restart statin home med as LFTs only slightly elevated   As per family pt on Lasix 40 po daily at home, started by her cardiologist Dr. Nain Pepper for LE edema -> currently off lasix, creat 2.41

## 2024-10-24 NOTE — DISCHARGE NOTE PROVIDER - CARE PROVIDERS DIRECT ADDRESSES
[email or web address],DirectAddress_Unknown,DirectAddress_Unknown

## 2024-10-24 NOTE — DISCHARGE NOTE NURSING/CASE MANAGEMENT/SOCIAL WORK - FINANCIAL ASSISTANCE
[facility] provides services at a reduced cost to those who are determined to be eligible through [facility]’s financial assistance program. Information regarding [facility]’s financial assistance program can be found by going to [email or web address] or by calling [phone].

## 2024-10-24 NOTE — DISCHARGE NOTE PROVIDER - NSDCMRMEDTOKEN_GEN_ALL_CORE_FT
allopurinol 300 mg oral tablet: 1 tab(s) orally once a day  amLODIPine 5 mg oral tablet: 1 tab(s) orally once a day  carvedilol 12.5 mg oral tablet: 1 tab(s) orally every 12 hours  clopidogrel 75 mg oral tablet: 1 tab(s) orally once a day  fenofibrate 134 mg oral capsule: 1 cap(s) orally once a day  levothyroxine 25 mcg (0.025 mg) oral tablet: 1 tab(s) orally once a day  montelukast 10 mg oral tablet: 1 tab(s) orally once a day  Namenda 10 mg oral tablet: 1 tab(s) orally once a day  omeprazole 40 mg oral delayed release capsule: 1 cap(s) orally once a day  oxybutynin 10 mg/24 hr oral tablet, extended release: 1 tab(s) orally once a day   allopurinol 300 mg oral tablet: 1 tab(s) orally once a day  amLODIPine 5 mg oral tablet: 1 tab(s) orally once a day  amoxicillin-clavulanate 875 mg-125 mg oral tablet: 1 tab(s) orally 2 times a day  carvedilol 12.5 mg oral tablet: 1 tab(s) orally every 12 hours  clopidogrel 75 mg oral tablet: 1 tab(s) orally once a day  fenofibrate 134 mg oral capsule: 1 cap(s) orally once a day  levothyroxine 25 mcg (0.025 mg) oral tablet: 1 tab(s) orally once a day  metroNIDAZOLE 500 mg oral tablet: 1 tab(s) orally 3 times a day  montelukast 10 mg oral tablet: 1 tab(s) orally once a day  Namenda 10 mg oral tablet: 1 tab(s) orally once a day  omeprazole 40 mg oral delayed release capsule: 1 cap(s) orally once a day  oxybutynin 10 mg/24 hr oral tablet, extended release: 1 tab(s) orally once a day

## 2024-10-24 NOTE — DISCHARGE NOTE PROVIDER - ATTENDING DISCHARGE PHYSICAL EXAMINATION:
Vital Signs Last 24 Hrs  T(C): 36.9 (24 Oct 2024 12:00), Max: 36.9 (24 Oct 2024 12:00)  T(F): 98.5 (24 Oct 2024 12:00), Max: 98.5 (24 Oct 2024 12:00)  HR: 82 (24 Oct 2024 12:00) (60 - 82)  BP: 132/79 (24 Oct 2024 12:00) (106/68 - 146/64)  BP(mean): --  RR: 18 (24 Oct 2024 12:00) (18 - 19)  SpO2: 98% (24 Oct 2024 12:00) (96% - 98%)    Parameters below as of 24 Oct 2024 12:00  Patient On (Oxygen Delivery Method): room air    GENERAL: NAD  HEAD:  Atraumatic, Normocephalic  EYES: EOMI, PERRLA, conjunctiva and sclera clear  ENMT: No tonsillar erythema, exudates, or enlargement; Moist mucous membranes, Good dentition, No lesions  NECK: Supple, No JVD, Normal thyroid  NERVOUS SYSTEM:  Alert & Oriented X3, non focal  CHEST/LUNG: Clear to percussion bilaterally; No rales, rhonchi, wheezing, or rubs  HEART: Regular rate and rhythm; No murmurs, rubs, or gallops  ABDOMEN: Soft, Nontender, Nondistended; Bowel sounds present  EXTREMITIES:  2+ Peripheral Pulses, No clubbing, cyanosis, or edema  LYMPH: No lymphadenopathy noted   SKIN: No rashes or lesions

## 2024-10-24 NOTE — PROGRESS NOTE ADULT - NS ATTEND AMEND GEN_ALL_CORE FT
Hemodynamically stable, no active ischemia.  GI evaluation still in progress.   Resume preadmission meds on discharge, incl Plavix and statin.  Please reconsult PRN.
Awaiting MRCP for abd pain/biliary duct dilatation.  No noted chest pain or dyspnea, BUN stable on IV diuretics, TTE pending.  Troponin decrementing, further risk stratification as o/p when medical issues resolved.

## 2024-10-24 NOTE — DISCHARGE NOTE PROVIDER - CARE PROVIDER_API CALL
Mendel French  Nephrology  [address] [facility], [address]  [place] [address]  Phone: [phone]  Fax: [phone]  Follow Up Time:     pcp,   Phone: (   )    -  Fax: (   )    -  Follow Up Time:     Kemar Stephenson  Surgery  [address] [facility] [facility] [address]  [place] [address]  Phone: [phone]  Fax: [phone]  Follow Up Time:

## 2024-10-24 NOTE — DISCHARGE NOTE NURSING/CASE MANAGEMENT/SOCIAL WORK - PATIENT PORTAL LINK FT
You can access the FollowMyHealth Patient Portal offered by [facility] by registering at the following website: [email or web address]. By joining [employer]’s FollowMyHealth portal, you will also be able to view your health information using other applications (apps) compatible with our system.

## 2024-10-24 NOTE — PROGRESS NOTE ADULT - REASON FOR ADMISSION
SIRS with CBD Dilation

## 2024-10-24 NOTE — DISCHARGE NOTE PROVIDER - PREFACE STATEMENT FOR MINUTES SPENT
Per Linda, Ms. Saini has been called and advised that the referral has been placed   I personally spent Yes...

## 2024-10-24 NOTE — DISCHARGE NOTE PROVIDER - NSDCCPCAREPLAN_GEN_ALL_CORE_FT
PRINCIPAL DISCHARGE DIAGNOSIS  Diagnosis: Sepsis  Assessment and Plan of Treatment: ·  Problem: Systemic inflammatory response syndrome (SIRS).  ·  Plan: In the ED patient met SIRS criteria with Fever T-max: 101.4, Tachycardia- HR:126, Tachypneic- HR:23.  - S/P IV-NS   - Unclear etiology, pancreatitis (possibly resolved gallstone pancreatitis) vs cholangitis vs cholecystitis  - Pain improved, afebrile now   Problem/Plan - 2:  ·  Problem: Abdominal pain.  ·  Plan: CT-ABD: Mild intrahepatic and extra hepatic biliary ductal dilatation with common bile duct tapering distally towards the ampulla and mild diffuse pancreatic ductal dilatation without obstructing radiopaque stone or mass identified. 1.1 cm irregular low-density lesion in the pancreatic tail with mild surrounding inflammatory change  - Pain improved  - MRCP : Motion degraded examination. Mild biliary ductal dilatation without choledocholithiasis. A few stones within a dilated pancreatic duct.  - GI eval appreciated  - Diet advanced, tolerating   - Outpt follow up for cholecystectomy    Problem/Plan - 3:  ·  Problem: Hypertension.  ·  Plan: Normotensive   Continue home meds   - Amlodipine 5mg  - Carvedilol 12.5mg.   Problem/Plan - 4:  ·  Problem: Diabetes mellitus.   ·  Plan: A1C 5.5  no med on dc dc   Problem/Plan - 5:  . NSTEMI:  - Hemodynamically stable,  TTE with preserved EF, mod MR/pHTN  Currently no evidence of active ischemia, trops downtrending   No immediate need for risk stratification, pt without any chest pain, Likely type 2 MI  on plavix for CVA/PVD hx, can restart statin home med as LFTs only slightly elevated   As per family pt on Lasix 40 po daily at home, started by her cardiologist Dr. Nain Pepper for LE edema -> currently off lasix, creat 2.41  Pt will follow up with her cardiologist   EDELMIRA on CKD 3:  - Cr overall stable  - Outpt follow up       PRINCIPAL DISCHARGE DIAGNOSIS  Diagnosis: Sepsis  Assessment and Plan of Treatment: ·  Problem: Systemic inflammatory response syndrome (SIRS).  ·  Plan: In the ED patient met SIRS criteria with Fever T-max: 101.4, Tachycardia- HR:126, Tachypneic- HR:23.  - S/P IV-NS   - Unclear etiology, pancreatitis (possibly resolved gallstone pancreatitis) vs cholangitis vs cholecystitis  - Pain improved, afebrile now   Problem/Plan - 2:  ·  Problem: Abdominal pain.  ·  Plan: CT-ABD: Mild intrahepatic and extra hepatic biliary ductal dilatation with common bile duct tapering distally towards the ampulla and mild diffuse pancreatic ductal dilatation without obstructing radiopaque stone or mass identified. 1.1 cm irregular low-density lesion in the pancreatic tail with mild surrounding inflammatory change  - Pain improved  - MRCP : Motion degraded examination. Mild biliary ductal dilatation without choledocholithiasis. A few stones within a dilated pancreatic duct.  - GI eval appreciated  - Diet advanced, tolerating   antibiotic for 4 more days  - Outpt follow up for cholecystectomy    Problem/Plan - 3:  ·  Problem: Hypertension.  ·  Plan: Normotensive   Continue home meds   - Amlodipine 5mg  - Carvedilol 12.5mg.   Problem/Plan - 4:  ·  Problem: Diabetes mellitus.   ·  Plan: A1C 5.5  no med on dc dc   Problem/Plan - 5:  . NSTEMI:  - Hemodynamically stable,  TTE with preserved EF, mod MR/pHTN  Currently no evidence of active ischemia, trops downtrending   No immediate need for risk stratification, pt without any chest pain, Likely type 2 MI  on plavix for CVA/PVD hx, can restart statin home med as LFTs only slightly elevated   As per family pt on Lasix 40 po daily at home, started by her cardiologist Dr. Nain Pepper for LE edema -> currently off lasix, creat 2.41  Pt will follow up with her cardiologist   EDELMIRA on CKD 3:  - Cr overall stable  - Outpt follow up

## 2024-10-26 LAB
CULTURE RESULTS: SIGNIFICANT CHANGE UP
CULTURE RESULTS: SIGNIFICANT CHANGE UP
SPECIMEN SOURCE: SIGNIFICANT CHANGE UP
SPECIMEN SOURCE: SIGNIFICANT CHANGE UP

## 2024-10-31 DIAGNOSIS — I21.A1 MYOCARDIAL INFARCTION TYPE 2: ICD-10-CM

## 2024-10-31 DIAGNOSIS — E03.9 HYPOTHYROIDISM, UNSPECIFIED: ICD-10-CM

## 2024-10-31 DIAGNOSIS — E11.22 TYPE 2 DIABETES MELLITUS WITH DIABETIC CHRONIC KIDNEY DISEASE: ICD-10-CM

## 2024-10-31 DIAGNOSIS — I69.353 HEMIPLEGIA AND HEMIPARESIS FOLLOWING CEREBRAL INFARCTION AFFECTING RIGHT NON-DOMINANT SIDE: ICD-10-CM

## 2024-10-31 DIAGNOSIS — K21.9 GASTRO-ESOPHAGEAL REFLUX DISEASE WITHOUT ESOPHAGITIS: ICD-10-CM

## 2024-10-31 DIAGNOSIS — K80.20 CALCULUS OF GALLBLADDER WITHOUT CHOLECYSTITIS WITHOUT OBSTRUCTION: ICD-10-CM

## 2024-10-31 DIAGNOSIS — E11.51 TYPE 2 DIABETES MELLITUS WITH DIABETIC PERIPHERAL ANGIOPATHY WITHOUT GANGRENE: ICD-10-CM

## 2024-10-31 DIAGNOSIS — N18.30 CHRONIC KIDNEY DISEASE, STAGE 3 UNSPECIFIED: ICD-10-CM

## 2024-10-31 DIAGNOSIS — A41.9 SEPSIS, UNSPECIFIED ORGANISM: ICD-10-CM

## 2024-10-31 DIAGNOSIS — I27.20 PULMONARY HYPERTENSION, UNSPECIFIED: ICD-10-CM

## 2024-10-31 DIAGNOSIS — I34.0 NONRHEUMATIC MITRAL (VALVE) INSUFFICIENCY: ICD-10-CM

## 2024-10-31 DIAGNOSIS — Z87.891 PERSONAL HISTORY OF NICOTINE DEPENDENCE: ICD-10-CM

## 2024-10-31 DIAGNOSIS — I12.9 HYPERTENSIVE CHRONIC KIDNEY DISEASE WITH STAGE 1 THROUGH STAGE 4 CHRONIC KIDNEY DISEASE, OR UNSPECIFIED CHRONIC KIDNEY DISEASE: ICD-10-CM

## 2024-10-31 DIAGNOSIS — R09.02 HYPOXEMIA: ICD-10-CM
